# Patient Record
Sex: MALE | Race: WHITE | NOT HISPANIC OR LATINO | Employment: FULL TIME | URBAN - METROPOLITAN AREA
[De-identification: names, ages, dates, MRNs, and addresses within clinical notes are randomized per-mention and may not be internally consistent; named-entity substitution may affect disease eponyms.]

---

## 2023-03-20 DIAGNOSIS — E03.9 HYPOTHYROIDISM, UNSPECIFIED TYPE: Primary | ICD-10-CM

## 2023-03-20 RX ORDER — LEVOTHYROXINE SODIUM 112 UG/1
112 TABLET ORAL
Qty: 30 TABLET | Refills: 0 | Status: SHIPPED | OUTPATIENT
Start: 2023-03-20 | End: 2023-04-18 | Stop reason: SDUPTHER

## 2023-03-20 RX ORDER — LEVOTHYROXINE SODIUM 112 UG/1
112 TABLET ORAL
COMMUNITY
End: 2023-03-20 | Stop reason: SDUPTHER

## 2023-04-17 ENCOUNTER — OFFICE VISIT (OUTPATIENT)
Dept: PRIMARY CARE | Facility: CLINIC | Age: 53
End: 2023-04-17
Payer: MEDICAID

## 2023-04-17 VITALS
DIASTOLIC BLOOD PRESSURE: 77 MMHG | HEIGHT: 70 IN | WEIGHT: 221 LBS | BODY MASS INDEX: 31.64 KG/M2 | RESPIRATION RATE: 18 BRPM | OXYGEN SATURATION: 96 % | HEART RATE: 70 BPM | TEMPERATURE: 98 F | SYSTOLIC BLOOD PRESSURE: 118 MMHG

## 2023-04-17 DIAGNOSIS — L30.9 CHRONIC DERMATITIS: ICD-10-CM

## 2023-04-17 DIAGNOSIS — E78.2 HYPERLIPIDEMIA, MIXED: ICD-10-CM

## 2023-04-17 DIAGNOSIS — E03.9 HYPOTHYROIDISM, UNSPECIFIED TYPE: ICD-10-CM

## 2023-04-17 DIAGNOSIS — M54.50 CHRONIC MIDLINE LOW BACK PAIN WITHOUT SCIATICA: ICD-10-CM

## 2023-04-17 DIAGNOSIS — Z00.00 HEALTHCARE MAINTENANCE: Primary | ICD-10-CM

## 2023-04-17 DIAGNOSIS — G89.29 CHRONIC MIDLINE LOW BACK PAIN WITHOUT SCIATICA: ICD-10-CM

## 2023-04-17 PROBLEM — H93.13 OBJECTIVE TINNITUS OF BOTH EARS: Status: ACTIVE | Noted: 2023-04-17

## 2023-04-17 PROBLEM — M62.89 PELVIC FLOOR DYSFUNCTION: Status: ACTIVE | Noted: 2023-04-17

## 2023-04-17 PROBLEM — K64.9 HEMORRHOIDS: Status: ACTIVE | Noted: 2023-04-17

## 2023-04-17 PROBLEM — M81.0 OSTEOPOROSIS OF VERTEBRA: Status: ACTIVE | Noted: 2023-04-17

## 2023-04-17 PROBLEM — Z86.0100 PERSONAL HISTORY OF COLONIC POLYPS: Status: ACTIVE | Noted: 2023-04-17

## 2023-04-17 PROBLEM — K21.9 ACID REFLUX: Status: ACTIVE | Noted: 2023-04-17

## 2023-04-17 PROBLEM — R09.81 NASAL CONGESTION: Status: ACTIVE | Noted: 2023-04-17

## 2023-04-17 PROBLEM — H93.8X2 EAR FULLNESS, LEFT: Status: ACTIVE | Noted: 2023-04-17

## 2023-04-17 PROBLEM — H93.233 HYPERACUSIS OF BOTH EARS: Status: ACTIVE | Noted: 2023-04-17

## 2023-04-17 PROBLEM — K59.00 CONSTIPATION: Status: ACTIVE | Noted: 2023-04-17

## 2023-04-17 PROBLEM — H90.3 BILATERAL SENSORINEURAL HEARING LOSS: Status: ACTIVE | Noted: 2023-04-17

## 2023-04-17 PROBLEM — Z86.010 PERSONAL HISTORY OF COLONIC POLYPS: Status: ACTIVE | Noted: 2023-04-17

## 2023-04-17 PROBLEM — S22.080A COMPRESSION FRACTURE OF T11 VERTEBRA (MULTI): Status: ACTIVE | Noted: 2023-04-17

## 2023-04-17 PROBLEM — S30.0XXA CONTUSION OF COCCYX: Status: ACTIVE | Noted: 2023-04-17

## 2023-04-17 PROBLEM — M54.2 NECK PAIN: Status: ACTIVE | Noted: 2023-04-17

## 2023-04-17 PROBLEM — M54.6 PAIN IN THORACIC SPINE: Status: ACTIVE | Noted: 2023-04-17

## 2023-04-17 PROCEDURE — 82465 ASSAY BLD/SERUM CHOLESTEROL: CPT

## 2023-04-17 PROCEDURE — 84443 ASSAY THYROID STIM HORMONE: CPT

## 2023-04-17 PROCEDURE — 80053 COMPREHEN METABOLIC PANEL: CPT

## 2023-04-17 PROCEDURE — 36415 COLL VENOUS BLD VENIPUNCTURE: CPT | Performed by: INTERNAL MEDICINE

## 2023-04-17 PROCEDURE — 99396 PREV VISIT EST AGE 40-64: CPT | Performed by: INTERNAL MEDICINE

## 2023-04-17 PROCEDURE — 83718 ASSAY OF LIPOPROTEIN: CPT

## 2023-04-17 PROCEDURE — 1036F TOBACCO NON-USER: CPT | Performed by: INTERNAL MEDICINE

## 2023-04-17 RX ORDER — CLOTRIMAZOLE AND BETAMETHASONE DIPROPIONATE 10; .64 MG/G; MG/G
1 CREAM TOPICAL 2 TIMES DAILY
COMMUNITY
Start: 2021-03-22 | End: 2023-04-18 | Stop reason: SDUPTHER

## 2023-04-17 RX ORDER — KETOCONAZOLE 20 MG/G
CREAM TOPICAL 2 TIMES DAILY
COMMUNITY
Start: 2021-06-08

## 2023-04-17 RX ORDER — CLOTRIMAZOLE 1 G/ML
SOLUTION TOPICAL 2 TIMES DAILY
COMMUNITY
Start: 2023-01-09

## 2023-04-17 RX ORDER — HYDROCORTISONE 25 MG/G
CREAM TOPICAL 2 TIMES DAILY
COMMUNITY
Start: 2021-03-23 | End: 2023-09-15 | Stop reason: SDUPTHER

## 2023-04-17 SDOH — ECONOMIC STABILITY: TRANSPORTATION INSECURITY
IN THE PAST 12 MONTHS, HAS LACK OF TRANSPORTATION KEPT YOU FROM MEETINGS, WORK, OR FROM GETTING THINGS NEEDED FOR DAILY LIVING?: NO

## 2023-04-17 SDOH — ECONOMIC STABILITY: FOOD INSECURITY: WITHIN THE PAST 12 MONTHS, YOU WORRIED THAT YOUR FOOD WOULD RUN OUT BEFORE YOU GOT MONEY TO BUY MORE.: NEVER TRUE

## 2023-04-17 SDOH — HEALTH STABILITY: PHYSICAL HEALTH: ON AVERAGE, HOW MANY MINUTES DO YOU ENGAGE IN EXERCISE AT THIS LEVEL?: 40 MIN

## 2023-04-17 SDOH — ECONOMIC STABILITY: FOOD INSECURITY: WITHIN THE PAST 12 MONTHS, THE FOOD YOU BOUGHT JUST DIDN'T LAST AND YOU DIDN'T HAVE MONEY TO GET MORE.: NEVER TRUE

## 2023-04-17 SDOH — ECONOMIC STABILITY: INCOME INSECURITY: IN THE LAST 12 MONTHS, WAS THERE A TIME WHEN YOU WERE NOT ABLE TO PAY THE MORTGAGE OR RENT ON TIME?: NO

## 2023-04-17 SDOH — ECONOMIC STABILITY: HOUSING INSECURITY
IN THE LAST 12 MONTHS, WAS THERE A TIME WHEN YOU DID NOT HAVE A STEADY PLACE TO SLEEP OR SLEPT IN A SHELTER (INCLUDING NOW)?: NO

## 2023-04-17 SDOH — HEALTH STABILITY: PHYSICAL HEALTH: ON AVERAGE, HOW MANY DAYS PER WEEK DO YOU ENGAGE IN MODERATE TO STRENUOUS EXERCISE (LIKE A BRISK WALK)?: 3 DAYS

## 2023-04-17 SDOH — ECONOMIC STABILITY: TRANSPORTATION INSECURITY
IN THE PAST 12 MONTHS, HAS THE LACK OF TRANSPORTATION KEPT YOU FROM MEDICAL APPOINTMENTS OR FROM GETTING MEDICATIONS?: NO

## 2023-04-17 ASSESSMENT — SOCIAL DETERMINANTS OF HEALTH (SDOH)
WITHIN THE LAST YEAR, HAVE YOU BEEN KICKED, HIT, SLAPPED, OR OTHERWISE PHYSICALLY HURT BY YOUR PARTNER OR EX-PARTNER?: NO
HOW OFTEN DO YOU ATTEND CHURCH OR RELIGIOUS SERVICES?: 1 TO 4 TIMES PER YEAR
ARE YOU MARRIED, WIDOWED, DIVORCED, SEPARATED, NEVER MARRIED, OR LIVING WITH A PARTNER?: PATIENT DECLINED
WITHIN THE LAST YEAR, HAVE TO BEEN RAPED OR FORCED TO HAVE ANY KIND OF SEXUAL ACTIVITY BY YOUR PARTNER OR EX-PARTNER?: NO
HOW OFTEN DO YOU GET TOGETHER WITH FRIENDS OR RELATIVES?: TWICE A WEEK
IN A TYPICAL WEEK, HOW MANY TIMES DO YOU TALK ON THE PHONE WITH FAMILY, FRIENDS, OR NEIGHBORS?: TWICE A WEEK
HOW HARD IS IT FOR YOU TO PAY FOR THE VERY BASICS LIKE FOOD, HOUSING, MEDICAL CARE, AND HEATING?: NOT VERY HARD
WITHIN THE LAST YEAR, HAVE YOU BEEN HUMILIATED OR EMOTIONALLY ABUSED IN OTHER WAYS BY YOUR PARTNER OR EX-PARTNER?: NO
WITHIN THE LAST YEAR, HAVE YOU BEEN AFRAID OF YOUR PARTNER OR EX-PARTNER?: NO
HOW OFTEN DO YOU ATTENT MEETINGS OF THE CLUB OR ORGANIZATION YOU BELONG TO?: PATIENT DECLINED
DO YOU BELONG TO ANY CLUBS OR ORGANIZATIONS SUCH AS CHURCH GROUPS UNIONS, FRATERNAL OR ATHLETIC GROUPS, OR SCHOOL GROUPS?: PATIENT DECLINED

## 2023-04-17 ASSESSMENT — ANXIETY QUESTIONNAIRES
3. WORRYING TOO MUCH ABOUT DIFFERENT THINGS: NOT AT ALL
IF YOU CHECKED OFF ANY PROBLEMS ON THIS QUESTIONNAIRE, HOW DIFFICULT HAVE THESE PROBLEMS MADE IT FOR YOU TO DO YOUR WORK, TAKE CARE OF THINGS AT HOME, OR GET ALONG WITH OTHER PEOPLE: NOT DIFFICULT AT ALL
7. FEELING AFRAID AS IF SOMETHING AWFUL MIGHT HAPPEN: NOT AT ALL
2. NOT BEING ABLE TO STOP OR CONTROL WORRYING: NOT AT ALL
5. BEING SO RESTLESS THAT IT IS HARD TO SIT STILL: NOT AT ALL
6. BECOMING EASILY ANNOYED OR IRRITABLE: NOT AT ALL
1. FEELING NERVOUS, ANXIOUS, OR ON EDGE: NOT AT ALL
4. TROUBLE RELAXING: NOT AT ALL
GAD7 TOTAL SCORE: 0

## 2023-04-17 ASSESSMENT — LIFESTYLE VARIABLES
HOW OFTEN DO YOU HAVE SIX OR MORE DRINKS ON ONE OCCASION: NEVER
HOW OFTEN DO YOU HAVE A DRINK CONTAINING ALCOHOL: 2-4 TIMES A MONTH
HOW MANY STANDARD DRINKS CONTAINING ALCOHOL DO YOU HAVE ON A TYPICAL DAY: 1 OR 2
AUDIT-C TOTAL SCORE: 2
SKIP TO QUESTIONS 9-10: 1

## 2023-04-17 ASSESSMENT — PATIENT HEALTH QUESTIONNAIRE - PHQ9
2. FEELING DOWN, DEPRESSED OR HOPELESS: NOT AT ALL
SUM OF ALL RESPONSES TO PHQ9 QUESTIONS 1 & 2: 0
1. LITTLE INTEREST OR PLEASURE IN DOING THINGS: NOT AT ALL

## 2023-04-17 ASSESSMENT — PAIN SCALES - GENERAL: PAINLEVEL: 4

## 2023-04-17 ASSESSMENT — ENCOUNTER SYMPTOMS
ACTIVITY CHANGE: 0
LOSS OF SENSATION IN FEET: 0
CHEST TIGHTNESS: 0
OCCASIONAL FEELINGS OF UNSTEADINESS: 0
SINUS PRESSURE: 0
SORE THROAT: 0
MYALGIAS: 0
DEPRESSION: 0
CHILLS: 0
WEAKNESS: 0
AGITATION: 0
SHORTNESS OF BREATH: 0

## 2023-04-17 ASSESSMENT — COLUMBIA-SUICIDE SEVERITY RATING SCALE - C-SSRS
2. HAVE YOU ACTUALLY HAD ANY THOUGHTS OF KILLING YOURSELF?: NO
1. IN THE PAST MONTH, HAVE YOU WISHED YOU WERE DEAD OR WISHED YOU COULD GO TO SLEEP AND NOT WAKE UP?: NO
6. HAVE YOU EVER DONE ANYTHING, STARTED TO DO ANYTHING, OR PREPARED TO DO ANYTHING TO END YOUR LIFE?: NO

## 2023-04-17 NOTE — PROGRESS NOTES
"Subjective   Patient ID: Glenn Kerns is a 53 y.o. male who presents for Annual Exam. Anrdews still has back pain from his fall down the stairs.  He is still needing therapy for his back.  He is seeing acupuncture for this as well but he has never fully recovered.  Also with recurrent dermatitis helped with a steroid cream.      HPI     Review of Systems   Constitutional:  Negative for activity change and chills.   HENT:  Negative for congestion, sinus pressure and sore throat.    Respiratory:  Negative for chest tightness and shortness of breath.    Cardiovascular:  Negative for chest pain.   Musculoskeletal:  Positive for back pain. Negative for gait problem, joint swelling and myalgias.   Neurological:  Negative for weakness.   Psychiatric/Behavioral:  Negative for agitation and behavioral problems.        Objective   /77 (BP Location: Left arm, Patient Position: Sitting, BP Cuff Size: Large adult)   Pulse 70   Temp 36.7 °C (98 °F) (Temporal)   Resp 18   Ht 1.778 m (5' 10\")   Wt 100 kg (221 lb)   SpO2 96%   BMI 31.71 kg/m²     Physical Exam  Constitutional:       Appearance: Normal appearance.   HENT:      Head: Normocephalic and atraumatic.   Eyes:      Extraocular Movements: Extraocular movements intact.   Pulmonary:      Effort: No respiratory distress.      Breath sounds: No wheezing.   Neurological:      General: No focal deficit present.         Assessment/Plan   Problem List Items Addressed This Visit    None  Visit Diagnoses       Healthcare maintenance    -  Primary    Relevant Orders    Thyroid Stimulating Hormone (Completed)    Comprehensive Metabolic Panel (Completed)    Lipid Panel Non-Fasting (Completed)    Follow Up In Primary Care    Chronic dermatitis        Relevant Medications    clotrimazole-betamethasone (Lotrisone) cream    Chronic midline low back pain without sciatica        Relevant Orders    Referral to Physical Therapy    Hypothyroidism, unspecified type        Relevant " Medications    levothyroxine (Synthroid, Levoxyl) 112 mcg tablet    Hyperlipidemia, mixed        Relevant Orders    Follow Up In Primary Care

## 2023-04-18 LAB
ALANINE AMINOTRANSFERASE (SGPT) (U/L) IN SER/PLAS: 34 U/L (ref 10–52)
ALBUMIN (G/DL) IN SER/PLAS: 4.8 G/DL (ref 3.4–5)
ALKALINE PHOSPHATASE (U/L) IN SER/PLAS: 55 U/L (ref 33–120)
ANION GAP IN SER/PLAS: 15 MMOL/L (ref 10–20)
ASPARTATE AMINOTRANSFERASE (SGOT) (U/L) IN SER/PLAS: 24 U/L (ref 9–39)
BILIRUBIN TOTAL (MG/DL) IN SER/PLAS: 1 MG/DL (ref 0–1.2)
CALCIUM (MG/DL) IN SER/PLAS: 9.7 MG/DL (ref 8.6–10.6)
CARBON DIOXIDE, TOTAL (MMOL/L) IN SER/PLAS: 26 MMOL/L (ref 21–32)
CHLORIDE (MMOL/L) IN SER/PLAS: 103 MMOL/L (ref 98–107)
CHOLESTEROL (MG/DL) IN SER/PLAS: 219 MG/DL (ref 0–199)
CHOLESTEROL IN HDL (MG/DL) IN SER/PLAS: 45.9 MG/DL
CHOLESTEROL/HDL RATIO: 4.8
CREATININE (MG/DL) IN SER/PLAS: 1.02 MG/DL (ref 0.5–1.3)
GFR MALE: 88 ML/MIN/1.73M2
GLUCOSE (MG/DL) IN SER/PLAS: 85 MG/DL (ref 74–99)
NON-HDL CHOLESTEROL: 173 MG/DL
POTASSIUM (MMOL/L) IN SER/PLAS: 3.8 MMOL/L (ref 3.5–5.3)
PROTEIN TOTAL: 7.4 G/DL (ref 6.4–8.2)
SODIUM (MMOL/L) IN SER/PLAS: 140 MMOL/L (ref 136–145)
THYROTROPIN (MIU/L) IN SER/PLAS BY DETECTION LIMIT <= 0.05 MIU/L: 1.63 MIU/L (ref 0.44–3.98)
UREA NITROGEN (MG/DL) IN SER/PLAS: 15 MG/DL (ref 6–23)

## 2023-04-18 RX ORDER — LEVOTHYROXINE SODIUM 112 UG/1
112 TABLET ORAL
Qty: 90 TABLET | Refills: 3 | Status: SHIPPED | OUTPATIENT
Start: 2023-04-18 | End: 2024-04-25 | Stop reason: SDUPTHER

## 2023-04-18 RX ORDER — CLOTRIMAZOLE AND BETAMETHASONE DIPROPIONATE 10; .64 MG/G; MG/G
1 CREAM TOPICAL 2 TIMES DAILY
Qty: 45 G | Refills: 1 | Status: SHIPPED | OUTPATIENT
Start: 2023-04-18 | End: 2023-05-18

## 2023-04-18 ASSESSMENT — ENCOUNTER SYMPTOMS
JOINT SWELLING: 0
BACK PAIN: 1

## 2023-09-11 ENCOUNTER — OFFICE VISIT (OUTPATIENT)
Dept: PRIMARY CARE | Facility: CLINIC | Age: 53
End: 2023-09-11
Payer: MEDICAID

## 2023-09-11 VITALS
RESPIRATION RATE: 18 BRPM | OXYGEN SATURATION: 98 % | HEIGHT: 70 IN | HEART RATE: 81 BPM | TEMPERATURE: 97.6 F | DIASTOLIC BLOOD PRESSURE: 83 MMHG | WEIGHT: 224 LBS | SYSTOLIC BLOOD PRESSURE: 145 MMHG | BODY MASS INDEX: 32.07 KG/M2

## 2023-09-11 DIAGNOSIS — Z12.11 COLON CANCER SCREENING: ICD-10-CM

## 2023-09-11 DIAGNOSIS — B35.6 TINEA CRURIS: ICD-10-CM

## 2023-09-11 DIAGNOSIS — K64.2 GRADE III HEMORRHOIDS: Primary | ICD-10-CM

## 2023-09-11 PROCEDURE — 99214 OFFICE O/P EST MOD 30 MIN: CPT | Performed by: INTERNAL MEDICINE

## 2023-09-11 PROCEDURE — 1036F TOBACCO NON-USER: CPT | Performed by: INTERNAL MEDICINE

## 2023-09-11 RX ORDER — CLOTRIMAZOLE AND BETAMETHASONE DIPROPIONATE 10; .64 MG/G; MG/G
1 CREAM TOPICAL 2 TIMES DAILY
Qty: 45 G | Refills: 1 | Status: SHIPPED | OUTPATIENT
Start: 2023-09-11 | End: 2023-10-10 | Stop reason: SDUPTHER

## 2023-09-11 RX ORDER — HYDROCORTISONE ACETATE 25 MG/1
25 SUPPOSITORY RECTAL 2 TIMES DAILY PRN
Qty: 30 SUPPOSITORY | Refills: 5 | Status: SHIPPED | OUTPATIENT
Start: 2023-09-11 | End: 2023-09-14 | Stop reason: SDUPTHER

## 2023-09-11 ASSESSMENT — ENCOUNTER SYMPTOMS
CHILLS: 0
SINUS PRESSURE: 0
ACTIVITY CHANGE: 0
AGITATION: 0
WEAKNESS: 0
MYALGIAS: 0
SORE THROAT: 0
PALPITATIONS: 0
SHORTNESS OF BREATH: 0
NAUSEA: 0
DIARRHEA: 0
CHEST TIGHTNESS: 0

## 2023-09-11 ASSESSMENT — PAIN SCALES - GENERAL: PAINLEVEL: 0-NO PAIN

## 2023-09-11 NOTE — PROGRESS NOTES
"Subjective   Patient ID: Glenn Kerns is a 53 y.o. male who presents for Hemorrhoids.  Fiber seemed to cause constipation.  He senses hemorrhoids that are more prominent.  He feels they are coming down.  He tries to manage it but its still a problem.  He noted some blood a week ago. Yesterday he sensed a fluttering in his rectum that is recurrent.  He tried ice pack and epsom salts.      HPI     Review of Systems   Constitutional:  Negative for activity change and chills.   HENT:  Negative for congestion, sinus pressure and sore throat.    Respiratory:  Negative for chest tightness and shortness of breath.    Cardiovascular:  Negative for chest pain and palpitations.   Gastrointestinal:  Negative for diarrhea and nausea.   Musculoskeletal:  Negative for myalgias.   Neurological:  Negative for weakness.   Psychiatric/Behavioral:  Negative for agitation and behavioral problems.        Objective   /83 (BP Location: Left arm, Patient Position: Sitting, BP Cuff Size: Large adult)   Pulse 81   Temp 36.4 °C (97.6 °F) (Temporal)   Resp 18   Ht 1.778 m (5' 10\")   Wt 102 kg (224 lb)   SpO2 98%   BMI 32.14 kg/m²     Physical Exam  Constitutional:       Appearance: Normal appearance.   HENT:      Head: Normocephalic and atraumatic.      Nose: Nose normal.      Mouth/Throat:      Mouth: Mucous membranes are moist.   Eyes:      Extraocular Movements: Extraocular movements intact.      Pupils: Pupils are equal, round, and reactive to light.   Cardiovascular:      Rate and Rhythm: Normal rate and regular rhythm.   Pulmonary:      Effort: No respiratory distress.      Breath sounds: No wheezing.   Abdominal:      General: Bowel sounds are normal.      Palpations: Abdomen is soft.   Neurological:      General: No focal deficit present.         Assessment/Plan   Problem List Items Addressed This Visit       Hemorrhoids - Primary    Relevant Medications    hydrocortisone (Anusol-HC) 25 mg suppository     Other Visit " Diagnoses       Tinea cruris        Relevant Medications    clotrimazole-betamethasone (Lotrisone) cream    Colon cancer screening        Relevant Orders    Referral to Gastroenterology        He wants to see GI for more advice.

## 2023-09-14 DIAGNOSIS — K64.2 GRADE III HEMORRHOIDS: ICD-10-CM

## 2023-09-14 RX ORDER — HYDROCORTISONE ACETATE 25 MG/1
25 SUPPOSITORY RECTAL DAILY
Qty: 24 SUPPOSITORY | Refills: 1 | Status: SHIPPED | OUTPATIENT
Start: 2023-09-14 | End: 2024-09-13

## 2023-09-15 DIAGNOSIS — K64.2 GRADE III HEMORRHOIDS: Primary | ICD-10-CM

## 2023-09-15 NOTE — TELEPHONE ENCOUNTER
Patient request to have a refill on the rectal cream for relief on outer area of rectum.  FYI the patient did get the suppository from pharmacy.

## 2023-09-17 RX ORDER — HYDROCORTISONE 25 MG/G
CREAM TOPICAL 2 TIMES DAILY
Qty: 30 G | Refills: 3 | Status: SHIPPED | OUTPATIENT
Start: 2023-09-17 | End: 2024-03-26 | Stop reason: SDUPTHER

## 2023-10-10 ENCOUNTER — OFFICE VISIT (OUTPATIENT)
Dept: PRIMARY CARE | Facility: CLINIC | Age: 53
End: 2023-10-10
Payer: MEDICAID

## 2023-10-10 VITALS
TEMPERATURE: 97.7 F | OXYGEN SATURATION: 96 % | DIASTOLIC BLOOD PRESSURE: 86 MMHG | RESPIRATION RATE: 18 BRPM | BODY MASS INDEX: 31.21 KG/M2 | WEIGHT: 218 LBS | SYSTOLIC BLOOD PRESSURE: 132 MMHG | HEIGHT: 70 IN | HEART RATE: 81 BPM

## 2023-10-10 DIAGNOSIS — R35.89 POLYURIA: Primary | ICD-10-CM

## 2023-10-10 DIAGNOSIS — R63.1 POLYDIPSIA: ICD-10-CM

## 2023-10-10 DIAGNOSIS — J40 BRONCHITIS: ICD-10-CM

## 2023-10-10 DIAGNOSIS — B35.6 TINEA CRURIS: ICD-10-CM

## 2023-10-10 PROCEDURE — 99213 OFFICE O/P EST LOW 20 MIN: CPT | Performed by: INTERNAL MEDICINE

## 2023-10-10 PROCEDURE — 83036 HEMOGLOBIN GLYCOSYLATED A1C: CPT

## 2023-10-10 PROCEDURE — 82043 UR ALBUMIN QUANTITATIVE: CPT

## 2023-10-10 PROCEDURE — 36415 COLL VENOUS BLD VENIPUNCTURE: CPT

## 2023-10-10 PROCEDURE — 82570 ASSAY OF URINE CREATININE: CPT

## 2023-10-10 PROCEDURE — 1036F TOBACCO NON-USER: CPT | Performed by: INTERNAL MEDICINE

## 2023-10-10 RX ORDER — CLOTRIMAZOLE AND BETAMETHASONE DIPROPIONATE 10; .64 MG/G; MG/G
1 CREAM TOPICAL 2 TIMES DAILY
Qty: 6 G | Refills: 0 | Status: SHIPPED | OUTPATIENT
Start: 2023-10-10 | End: 2023-11-09

## 2023-10-10 RX ORDER — AZITHROMYCIN 250 MG/1
TABLET, FILM COATED ORAL
Qty: 6 TABLET | Refills: 0 | Status: SHIPPED | OUTPATIENT
Start: 2023-10-10 | End: 2023-10-14

## 2023-10-10 RX ORDER — BENZONATATE 200 MG/1
200 CAPSULE ORAL 3 TIMES DAILY PRN
COMMUNITY
Start: 2023-10-02 | End: 2024-04-25 | Stop reason: WASHOUT

## 2023-10-10 RX ORDER — DOXYCYCLINE 100 MG/1
100 CAPSULE ORAL 2 TIMES DAILY
COMMUNITY
Start: 2023-10-02 | End: 2024-04-25 | Stop reason: WASHOUT

## 2023-10-10 ASSESSMENT — ENCOUNTER SYMPTOMS
COUGH: 1
HOARSE VOICE: 1

## 2023-10-10 NOTE — PROGRESS NOTES
"Subjective   Patient ID: Glenn Kerns is a 53 y.o. male who presents for Sinusitis. Had 7 days of doxycycline.  Mucous was brown and green.  Now its mostly white now.  Three days ago he stood in the shower and felt light-headed.  He went to [place].  Also reports a return of his inguinal rash (jock itch).    Sinusitis  The current episode started 1 to 4 weeks ago. The problem has been gradually worsening since onset. Associated symptoms include congestion, coughing and a hoarse voice. Past treatments include antibiotics.        Review of Systems   HENT:  Positive for congestion and hoarse voice.    Respiratory:  Positive for cough.        Objective   /86 (BP Location: Left arm, Patient Position: Sitting, BP Cuff Size: Large adult)   Pulse 81   Temp 36.5 °C (97.7 °F)   Resp 18   Ht 1.778 m (5' 10\")   Wt 98.9 kg (218 lb)   SpO2 96%   BMI 31.28 kg/m²     Physical Exam  Constitutional:       Appearance: Normal appearance.   HENT:      Head: Normocephalic and atraumatic.      Nose: Nose normal.      Mouth/Throat:      Mouth: Mucous membranes are moist.   Eyes:      Extraocular Movements: Extraocular movements intact.      Pupils: Pupils are equal, round, and reactive to light.   Cardiovascular:      Rate and Rhythm: Normal rate and regular rhythm.   Pulmonary:      Effort: No respiratory distress.      Breath sounds: No wheezing.      Comments: Increased breath sounds on the right base  Abdominal:      General: Bowel sounds are normal.      Palpations: Abdomen is soft.   Neurological:      General: No focal deficit present.         Assessment/Plan   Problem List Items Addressed This Visit             ICD-10-CM    Bronchitis J40    Relevant Medications    azithromycin (Zithromax) 250 mg tablet    Polydipsia R63.1    Relevant Orders    Hemoglobin A1C    Albumin , Urine Random    Polyuria - Primary R35.89    Relevant Orders    Hemoglobin A1C    Albumin , Urine Random     Other Visit Diagnoses         Codes    " Tinea cruris     B35.6    Relevant Medications    clotrimazole-betamethasone (Lotrisone) cream        We will rule out diabetes and treat a little longer for prolonged bronchitis.

## 2023-10-11 ENCOUNTER — TELEPHONE (OUTPATIENT)
Dept: PRIMARY CARE | Facility: CLINIC | Age: 53
End: 2023-10-11
Payer: MEDICAID

## 2023-10-11 LAB
CREAT UR-MCNC: 159.4 MG/DL (ref 20–370)
EST. AVERAGE GLUCOSE BLD GHB EST-MCNC: 105 MG/DL
HBA1C MFR BLD: 5.3 %
MICROALBUMIN UR-MCNC: <7 MG/L
MICROALBUMIN/CREAT UR: NORMAL MG/G{CREAT}

## 2023-10-11 NOTE — TELEPHONE ENCOUNTER
Result Communication    Resulted Orders   Hemoglobin A1C   Result Value Ref Range    Hemoglobin A1C 5.3 see below %    Estimated Average Glucose 105 Not Established mg/dL    Narrative    Diagnosis of Diabetes-Adults  Non-Diabetic: < or = 5.6%  Increased risk for developing diabetes: 5.7-6.4%  Diagnostic of diabetes: > or = 6.5%    Monitoring of Diabetes  Age (y)....................... Therapeutic Goal (%)  Adults: >18.........................<7.0  Pediatrics: 13-18...................<7.5  Pediatrics: 7-12....................<8.0  Pediatrics: 0-6..................... 7.5-8.5    American Diabetes Association. Diabetes Care 33(S1), Jan 2010       Albumin , Urine Random   Result Value Ref Range    Albumin, Urine Random <7.0 Not established mg/L    Creatinine, Urine Random 159.4 20.0 - 370.0 mg/dL    Albumin/Creatine Ratio        Comment:      One or more analytes used in this calculation is outside of the analytical measurement range. Calculation cannot be performed.       9:28 AM      Results were successfully communicated with the patient and they acknowledged their understanding.

## 2023-10-19 ENCOUNTER — APPOINTMENT (OUTPATIENT)
Dept: GASTROENTEROLOGY | Facility: CLINIC | Age: 53
End: 2023-10-19
Payer: MEDICAID

## 2023-10-24 ENCOUNTER — OFFICE VISIT (OUTPATIENT)
Dept: PRIMARY CARE | Facility: CLINIC | Age: 53
End: 2023-10-24
Payer: MEDICAID

## 2023-10-24 VITALS
TEMPERATURE: 98.1 F | BODY MASS INDEX: 31.73 KG/M2 | OXYGEN SATURATION: 96 % | HEART RATE: 68 BPM | WEIGHT: 221.6 LBS | SYSTOLIC BLOOD PRESSURE: 125 MMHG | RESPIRATION RATE: 18 BRPM | HEIGHT: 70 IN | DIASTOLIC BLOOD PRESSURE: 83 MMHG

## 2023-10-24 DIAGNOSIS — R09.81 NASAL CONGESTION: Primary | ICD-10-CM

## 2023-10-24 PROCEDURE — 99213 OFFICE O/P EST LOW 20 MIN: CPT | Performed by: INTERNAL MEDICINE

## 2023-10-24 PROCEDURE — 1036F TOBACCO NON-USER: CPT | Performed by: INTERNAL MEDICINE

## 2023-10-24 ASSESSMENT — PAIN SCALES - GENERAL: PAINLEVEL: 0-NO PAIN

## 2023-10-24 ASSESSMENT — ENCOUNTER SYMPTOMS
SINUS PAIN: 0
FATIGUE: 0
SORE THROAT: 0
SINUS PRESSURE: 0

## 2023-10-24 NOTE — PROGRESS NOTES
"Subjective   Patient ID: Glenn Kerns is a 53 y.o. male who presents for Medication Problem.  He is here with continued sputum that is off color.  Saw a chat doctor online.  Put on doxycyline and had a reaction in the shower.  Came to see me and I gave z-can and he took 4 pills and he had to stop due to vertigo symptoms and diarrhea.  He stopped the antibiotic and took gustavo and sinus dripping.  Still with sinus dripping.  He has a appointment with someone related to his hemorrhoids.      HPI     Review of Systems   Constitutional:  Negative for fatigue.   HENT:  Negative for sinus pressure, sinus pain and sore throat.        Objective   /83 (BP Location: Left arm, Patient Position: Sitting, BP Cuff Size: Large adult)   Pulse 68   Temp 36.7 °C (98.1 °F) (Temporal)   Resp 18   Ht 1.778 m (5' 10\")   Wt 101 kg (221 lb 9.6 oz)   SpO2 96%   BMI 31.80 kg/m²     Physical Exam  Constitutional:       Appearance: Normal appearance.   HENT:      Head: Normocephalic and atraumatic.      Nose: Nose normal.      Mouth/Throat:      Mouth: Mucous membranes are moist.   Cardiovascular:      Rate and Rhythm: Normal rate and regular rhythm.   Pulmonary:      Effort: No respiratory distress.      Breath sounds: No wheezing.   Abdominal:      General: Bowel sounds are normal.      Palpations: Abdomen is soft.         Assessment/Plan   Problem List Items Addressed This Visit             ICD-10-CM    Nasal congestion - Primary R09.81   Patient asked to try oil of oregano since he had trouble with antibiotics.  I reassured him that was going on is not a serious problem and that he should embrace healthy habits and diet.       "

## 2023-11-09 ENCOUNTER — APPOINTMENT (OUTPATIENT)
Dept: GASTROENTEROLOGY | Facility: CLINIC | Age: 53
End: 2023-11-09
Payer: MEDICAID

## 2023-12-06 ENCOUNTER — APPOINTMENT (OUTPATIENT)
Dept: GASTROENTEROLOGY | Facility: CLINIC | Age: 53
End: 2023-12-06
Payer: MEDICAID

## 2024-01-29 ENCOUNTER — DOCUMENTATION (OUTPATIENT)
Dept: PHYSICAL THERAPY | Facility: CLINIC | Age: 54
End: 2024-01-29
Payer: MEDICAID

## 2024-01-29 NOTE — PROGRESS NOTES
Physical Therapy    Discharge Summary    Name: Glenn Kerns  MRN: [id number]  : 1970  Date: 24    Discharge Summary: PT    Discharge Information: Date of discharge 24    Therapy Summary: progress plateaued     Discharge Status: discharge to [facility]     Rehab Discharge Reason: Failed to schedule and/or keep follow-up appointment(s)

## 2024-02-01 PROBLEM — M54.16 LUMBAR RADICULOPATHY: Status: ACTIVE | Noted: 2024-02-01

## 2024-02-01 NOTE — PROGRESS NOTES
Physical Therapy    Physical Therapy Evaluation and Treatment      Patient Name: Glenn Kerns  MRN: [id number]  Today's Date: 2/2/2024  Visit1  Time Calculation  Start Time: 1055  Stop Time: 1200  Time Calculation (min): 65 min    Assessment:    Patient presents with clinical signs and symptoms consistent with lumbar disc derangement with R L5 and L S1 LE radicular symptoms.   These impairments affect ADLs, work, recreational, exercise, transfer, ambulation, lift/carry, and sleep function that requires skilled PT intervention to resolve and enable patient to return to previous level of function. Factors that may affect progress in PT are chronic pain, obesity, medical co-morbidities,  work task strain,  depression, and patient compliance. Patient is a   extension/stabilization,  program candidate.  Initial treatment of extensive education regarding Veronica extension approach and prone positioning with cold to anesthatize the lumbar region soft tissues  Plan:  OP PT Plan  Treatment/Interventions: Cryotherapy, Education/ Instruction, Electrical stimulation, Hot pack, Manual therapy, Neuromuscular re-education, Self care/ home management, Taping techniques, Therapeutic activities, Therapeutic exercises  PT Plan: Skilled PT  PT Frequency: 2 times per week  Duration: 12  Onset Date: 11/15/23  Certification Period Start Date: 02/02/24  Certification Period End Date: 05/02/24  Rehab Potential: Good  Plan of Care Agreement: Patient    Current Problem:   1. Lumbar radiculopathy  Follow Up In Physical Therapy    Follow Up In Physical Therapy          Subjective    Onset 11/15/23 lifting box. Now has LBP and R big toe numbness and L S1 distribution symptom distal LE. H/o T11 Fx in 2021 and was treated here. 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 Worse any prolonged position sometimes better reyna pose position. Glenn Kerns goes to acupuncture 1x/wk   Scheduled for EMG next week.  Pain:   6/10   Home  Living:   Lives with elderly parent 2 story home  Prior Level of Function:  Glenn Kerns had LBP but not radicular LE symptom  Prior Function Per Pt/Caregiver Report  Hand Dominance: Right    Objective   Cognition:   A+O x3  Observation:    Hip joint clearance: PROM  Clear  Single leg stance:  Eyes open  R  sec   L  secNT    Lumbar AROM in standing:    Flex  <50 % P fingers to  , Extension   90 %  ,   Sidebending  R  90%  L  50% P L  ,  BB with rotation      Myotomal Strength:  L3   R 5/5   L 5/5,  L4  R  5/5  L  5/5, L5 R  5/5  L 5 /5,  S1   R  5/5  L 5 /5    Sensation: Numbness R gr toe  L5 dermatomal distribution      Accessory joint mobility: PA glide hypomobile   all lumbar with  Pain    Lumbopelvic mobility: Side glide  R   L   restricted   cleared    Palpation: tenderness to S1 to L1 spinous process,  iliac crest  PSIS R  L   and  lateral sacral border  R    L    all posterior soft tissues hypersensitive    Gait: WNL  Special Tests:   , R SLR 45 deg, L SLR + at 45 deg distal in S1  Outcome Measures:  Oswestry: 48%    Treatments:  There ex: Delma extension approach prone positioning, extensive discussion about regressing other exercises that he had been doing and focus on prone position and minimizing lumbar flexion strain to minimize lumbar disc derangement    EDUCATION:   extensive education regarding mechanism of injury, relevant functional anatomy, treatment program rational, self management, HEP, and POC   focusing on Delma extension approach starting with prone position and avoidance of lumbar flexion. Glenn Kerns has a good lumbar roll to minimize lumbar flexion in sitting    Goals:  1. Decrease pain LBP  to 0-3/10 severity level   2. Centralize bilat LE radicular symptom  3. Increase lumbar FWD bending to fingers to midshin and return to upright neutral pain free  4. Reduce lumbar soft tissue hypersensitivity by 50%  5. Improve oswestry score by 10%points

## 2024-02-02 ENCOUNTER — EVALUATION (OUTPATIENT)
Dept: PHYSICAL THERAPY | Facility: CLINIC | Age: 54
End: 2024-02-02
Payer: MEDICAID

## 2024-02-02 DIAGNOSIS — M54.16 LUMBAR RADICULOPATHY: Primary | ICD-10-CM

## 2024-02-02 PROCEDURE — 97535 SELF CARE MNGMENT TRAINING: CPT | Mod: GP | Performed by: PHYSICAL THERAPIST

## 2024-02-02 PROCEDURE — 97162 PT EVAL MOD COMPLEX 30 MIN: CPT | Mod: GP | Performed by: PHYSICAL THERAPIST

## 2024-02-02 PROCEDURE — 97110 THERAPEUTIC EXERCISES: CPT | Mod: GP | Performed by: PHYSICAL THERAPIST

## 2024-02-02 ASSESSMENT — ENCOUNTER SYMPTOMS
DEPRESSION: 0
OCCASIONAL FEELINGS OF UNSTEADINESS: 0
LOSS OF SENSATION IN FEET: 1

## 2024-02-09 NOTE — PROGRESS NOTES
Physical Therapy    Physical Therapy Evaluation and Treatment      Patient Name: Glenn Kerns  MRN: [id number]  Today's Date: 2/12/24  Visit 2  Time Calculation  Start Time: 0900  Stop Time: 0945  Time Calculation (min): 45 min    Assessment:  Glenn Kerns is responsive to extension positioning approach to centralize bilat LE radicular symptom . He progressed to  to 2 pillow prone extension and  hard walk    Plan:   Continue Extension POC progress to posterior chain and core activation.    Current Problem:   1. Lumbar radiculopathy  Follow Up In Physical Therapy          Subjective    Glenn Kerns had an EMG last week and it was difficult. He initially had increased LBP and fluctuating  bilat leg symptom but is better today   Pain:   6/10        Objective     Observation:    Glenn Kerns entered clinic with lumbar cinch up support on but  too loose    Treatments:  Modality:  HP to lumbar region in prone 10 min  There ex:   Delma extension approach prone positioning, flat to 2 pillow static extension  Hard walk psot reduction     EDUCATION:  2 pillow static prone progression     Goals:  1. Decrease pain LBP  to 0-3/10 severity level   2. Centralize bilat LE radicular symptom  3. Increase lumbar FWD bending to fingers to midshin and return to upright neutral pain free  4. Reduce lumbar soft tissue hypersensitivity by 50%  5. Improve oswestry score by 10%points

## 2024-02-12 ENCOUNTER — TREATMENT (OUTPATIENT)
Dept: PHYSICAL THERAPY | Facility: CLINIC | Age: 54
End: 2024-02-12
Payer: MEDICAID

## 2024-02-12 DIAGNOSIS — M54.16 LUMBAR RADICULOPATHY: Primary | ICD-10-CM

## 2024-02-12 PROCEDURE — 97110 THERAPEUTIC EXERCISES: CPT | Mod: GP | Performed by: PHYSICAL THERAPIST

## 2024-02-12 PROCEDURE — 97010 HOT OR COLD PACKS THERAPY: CPT | Mod: GP | Performed by: PHYSICAL THERAPIST

## 2024-02-14 NOTE — PROGRESS NOTES
Physical Therapy    Physical Therapy  and Treatment      Patient Name: Glenn Kerns  MRN: [id number]  Today's Date: 2/15/24  Visit 3  Time Calculation  Start Time: 1345  Stop Time: 1430  Time Calculation (min): 45 min    Assessment:  Glenn Kerns has a lateral component to his L LE radicular symptom and is reducible. His L LE sx is centralized and he has slight L hip pain 2/10   Plan:   Continue Extension POC progress to posterior chain and core activation.    Current Problem:   1. Lumbar radiculopathy  Follow Up In Physical Therapy          Subjective    Glenn Kerns having a bad day today. He had acupuncture 2 days ago and has been worse since. He has more severe L heel pain . The back brace tightened up help   Pain:   6-7/10        Objective   Observation:      Antalgic L  LE painful at L heel strike  Treatments:  Modality:  HP to lumbar region in prone 10 min  There ex: 25min  Delma extension approach prone positioning, flat to 2 pillow static extension  Standing R to L side glide to centralize L LE symptom  Prone press up 3x10 Including L to R side glide component  Recovery walk post reduction   Standing green tband row  both arms and alternate arms 2 x 15  Manual therapy: 15 min  Prone press up 3x10 Including L to R side glide component  EDUCATION:  2 pillow static prone progression     Goals:  1. Decrease pain LBP  to 0-3/10 severity level   2. Centralize bilat LE radicular symptom  3. Increase lumbar FWD bending to fingers to midshin and return to upright neutral pain free  4. Reduce lumbar soft tissue hypersensitivity by 50%  5. Improve oswestry score by 10%points

## 2024-02-15 ENCOUNTER — TREATMENT (OUTPATIENT)
Dept: PHYSICAL THERAPY | Facility: CLINIC | Age: 54
End: 2024-02-15
Payer: MEDICAID

## 2024-02-15 DIAGNOSIS — M54.16 LUMBAR RADICULOPATHY: Primary | ICD-10-CM

## 2024-02-15 PROCEDURE — 97140 MANUAL THERAPY 1/> REGIONS: CPT | Mod: GP | Performed by: PHYSICAL THERAPIST

## 2024-02-15 PROCEDURE — 97010 HOT OR COLD PACKS THERAPY: CPT | Mod: GP | Performed by: PHYSICAL THERAPIST

## 2024-02-15 PROCEDURE — 97110 THERAPEUTIC EXERCISES: CPT | Mod: GP | Performed by: PHYSICAL THERAPIST

## 2024-02-16 NOTE — PROGRESS NOTES
Physical Therapy    Physical Therapy  and Treatment      Patient Name: Glenn Kerns  MRN: [id number]  Today's Date: 2/15/24  Visit 3       Assessment:  Glenn Kerns has a lateral component to his L LE radicular symptom and is reducible. His L LE sx is centralized and he has slight L hip pain 2/10   Plan:   Continue Extension POC progress to posterior chain and core activation.    Current Problem:   1. Lumbar radiculopathy            Subjective    Glenn Kerns having a bad day today. He had acupuncture 2 days ago and has been worse since. He has more severe L heel pain . The back brace tightened up help   Pain:   6-7/10        Objective   Observation:      Antalgic L  LE painful at L heel strike  Treatments:  Modality:  HP to lumbar region in prone 10 min  There ex: 25min  Delma extension approach prone positioning, flat to 2 pillow static extension  Standing R to L side glide to centralize L LE symptom  Prone press up 3x10 Including L to R side glide component  Recovery walk post reduction   Standing green tband row  both arms and alternate arms 2 x 15  Manual therapy: 15 min  Prone press up 3x10 Including L to R side glide component  EDUCATION:  2 pillow static prone progression     Goals:  1. Decrease pain LBP  to 0-3/10 severity level   2. Centralize bilat LE radicular symptom  3. Increase lumbar FWD bending to fingers to midshin and return to upright neutral pain free  4. Reduce lumbar soft tissue hypersensitivity by 50%  5. Improve oswestry score by 10%points

## 2024-02-19 ENCOUNTER — APPOINTMENT (OUTPATIENT)
Dept: PHYSICAL THERAPY | Facility: CLINIC | Age: 54
End: 2024-02-19
Payer: MEDICAID

## 2024-02-21 NOTE — PROGRESS NOTES
Physical Therapy    Physical Therapy  and Treatment      Patient Name: Glenn Kerns  MRN: [id number]  Today's Date: 2/22/24  Visit 4       Assessment:  Glenn Kerns has a lateral component to his L LE radicular symptom and is reducible. His L LE sx is centralized and he has slight L hip pain 2/10   Plan:   Continue Extension POC progress to posterior chain and core activation.    Current Problem:   1. Lumbar radiculopathy            Subjective    Glenn Kerns having a bad day today. He had acupuncture 2 days ago and has been worse since. He has more severe L heel pain . The back brace tightened up help   Pain:   6-7/10        Objective   Observation:      Antalgic L  LE painful at L heel strike  Treatments:  Modality:  HP to lumbar region in prone 10 min  There ex: 25min  Delma extension approach prone positioning, flat to 2 pillow static extension  Standing R to L side glide to centralize L LE symptom  Prone press up 3x10 Including L to R side glide component  Recovery walk post reduction   Standing green tband row  both arms and alternate arms 2 x 15  Manual therapy: 15 min  Prone press up 3x10 Including L to R side glide component  EDUCATION:  2 pillow static prone progression     Goals:  1. Decrease pain LBP  to 0-3/10 severity level   2. Centralize bilat LE radicular symptom  3. Increase lumbar FWD bending to fingers to midshin and return to upright neutral pain free  4. Reduce lumbar soft tissue hypersensitivity by 50%  5. Improve oswestry score by 10%points

## 2024-02-22 ENCOUNTER — APPOINTMENT (OUTPATIENT)
Dept: PHYSICAL THERAPY | Facility: CLINIC | Age: 54
End: 2024-02-22
Payer: MEDICAID

## 2024-02-23 NOTE — PROGRESS NOTES
Physical Therapy    Physical Therapy  and Treatment      Patient Name: Glenn Kerns  MRN: [id number]  Today's Date: 2/26/24  Visit 5  Time Calculation  Start Time: 1030  Stop Time: 1120  Time Calculation (min): 50 min    Assessment:  Glenn Kerns is progressing well with the Delma extension approach. His L LE sx is centralized most of the time and he can maneuver to centralize when it returns. We progressed him to prone hip extension to activate multifidi   Plan:   Continue Extension POC progress to posterior chain and core activation.    Current Problem:   1. Lumbar radiculopathy  Follow Up In Physical Therapy          Subjective    Glenn Kerns is getting better with L LE radicular symptom centralized and LBP 3/10  Pain:   3/10        Objective   Observation:    Glenn Kerns continue to move in very guarded posture when changing position on treatment table    Treatments:  Modality:  HP to lumbar region in prone 10 min  There ex: 25min  Delma extension approach prone positioning, flat to 2 pillow static extension  Standing R to L side glide to centralize L LE symptom  Supine on lumbar extension curve device 2 min   Prone hip extension to engage deep local lumbar extensors /multifi 2x10  Recovery walk post reduction   Standing green tband row  both arms and alternate arms 2 x 15  Manual therapy: 15 min  Prone press up 3x10   Lower thoracic PA glides for extension mobility  L mid thoracic TP pressure  EDUCATION:  Access Code: [email or web address]  URL: [email or web address]  Date: 02/26/2024  Prepared by: Anival Portillo    Exercises  - Prone Hip Extension  - 1 x daily - 7 x weekly - 2 sets - 10 reps    Goals:  1. Decrease pain LBP  to 0-3/10 severity level   2. Centralize bilat LE radicular symptom  3. Increase lumbar FWD bending to fingers to midshin and return to upright neutral pain free  4. Reduce lumbar soft tissue hypersensitivity by 50%  5. Improve oswestry score by 10%points

## 2024-02-26 ENCOUNTER — TREATMENT (OUTPATIENT)
Dept: PHYSICAL THERAPY | Facility: CLINIC | Age: 54
End: 2024-02-26
Payer: MEDICAID

## 2024-02-26 DIAGNOSIS — M54.16 LUMBAR RADICULOPATHY: Primary | ICD-10-CM

## 2024-02-26 PROCEDURE — 97140 MANUAL THERAPY 1/> REGIONS: CPT | Mod: GP | Performed by: PHYSICAL THERAPIST

## 2024-02-26 PROCEDURE — 97010 HOT OR COLD PACKS THERAPY: CPT | Mod: GP | Performed by: PHYSICAL THERAPIST

## 2024-02-26 PROCEDURE — 97110 THERAPEUTIC EXERCISES: CPT | Mod: GP | Performed by: PHYSICAL THERAPIST

## 2024-02-27 NOTE — PROGRESS NOTES
Physical Therapy    Physical Therapy  and Treatment      Patient Name: Glenn Kerns  MRN: [id number]  Today's Date: 2/28/24  Visit 6  Time Calculation  Start Time: 1000  Stop Time: 1045  Time Calculation (min): 45 min     PT Therapeutic Procedures Time Entry  Manual Therapy Time Entry: 15  Therapeutic Exercise Time Entry: 30                        Assessment:  Glenn Kerns is now able to progress to Closed kinetic chain  stabilization exercise to regain functional weightbearing activity tolerance including anti-rotation strain. No increase in leg symptom with increased exercise intensity  Plan:   Continue Extension POC progress to posterior chain and core activation.    Current Problem:   1. Lumbar radiculopathy  Follow Up In Physical Therapy          Subjective    Glenn Kerns has proximal muscle discomfort since last visit but no L E pain , and some numbness in R big toe. Overall moving more easily   Pain:   3/10        Objective   Observation:    Glenn Kerns is moving with less guarding e when changing position on treatment table    Treatments:    There ex: 30min  Done today  Quadruped alternating arm reach for suspended stabilization  x20  Quadruped cat/cow to regain awareness of lumbopelvic mobility in pain free range  Standing Anti-rotation green ttube 5 x 15 sec each side  Recumbent stepper 8 min RPM 75  Standing green tband row  both arms and alternate arms 2 x 15  Prone hip extension to engage deep local lumbar extensors /multifi 2x10  Recovery walk post reduction   Not done today  Delma extension approach prone positioning, flat to 2 pillow static extension  Standing R to L side glide to centralize L LE symptom  Supine on lumbar extension curve device 2 min       Manual therapy: 15 min  Prone press up 3x10   Lower thoracic PA glides for extension mobility  L mid thoracic TP pressure  EDUCATION:  .quadruped progression initiated  see written hep scanned     Goals:  1. Decrease pain LBP  to 0-3/10 severity  level   2. Centralize bilat LE radicular symptom  3. Increase lumbar FWD bending to fingers to midshin and return to upright neutral pain free  4. Reduce lumbar soft tissue hypersensitivity by 50%  5. Improve oswestry score by 10%points

## 2024-02-28 ENCOUNTER — TREATMENT (OUTPATIENT)
Dept: PHYSICAL THERAPY | Facility: CLINIC | Age: 54
End: 2024-02-28
Payer: MEDICAID

## 2024-02-28 DIAGNOSIS — M54.16 LUMBAR RADICULOPATHY: Primary | ICD-10-CM

## 2024-02-28 PROCEDURE — 97140 MANUAL THERAPY 1/> REGIONS: CPT | Mod: GP | Performed by: PHYSICAL THERAPIST

## 2024-02-28 PROCEDURE — 97110 THERAPEUTIC EXERCISES: CPT | Mod: GP | Performed by: PHYSICAL THERAPIST

## 2024-03-04 ENCOUNTER — TREATMENT (OUTPATIENT)
Dept: PHYSICAL THERAPY | Facility: CLINIC | Age: 54
End: 2024-03-04
Payer: MEDICAID

## 2024-03-04 DIAGNOSIS — M54.16 LUMBAR RADICULOPATHY: Primary | ICD-10-CM

## 2024-03-04 PROCEDURE — 97110 THERAPEUTIC EXERCISES: CPT | Mod: GP | Performed by: PHYSICAL THERAPIST

## 2024-03-04 PROCEDURE — 97140 MANUAL THERAPY 1/> REGIONS: CPT | Mod: GP | Performed by: PHYSICAL THERAPIST

## 2024-03-04 NOTE — PROGRESS NOTES
Physical Therapy    Physical Therapy  and Treatment      Patient Name: Glenn Kerns  MRN: [id number]  Today's Date: 3/4/24  Visit 6  Time Calculation  Start Time: 1430  Stop Time: 1520  Time Calculation (min): 50 min     Assessment:  Glenn Kerns is now able to progress to Closed kinetic chain  stabilization exercise to regain functional weightbearing activity tolerance including anti-rotation strain. No increase in leg symptom with increased exercise intensity  Plan:   Continue Extension POC progress to posterior chain and core activation.    Current Problem:   1. Lumbar radiculopathy  Follow Up In Physical Therapy          Subjective    Glenn Kerns notes that he had about of L LBP and proximal posterior leg symptom walking today and bilat distal  symptom. Better now. R toe numb.  Pain:   5/10        Objective   Observation:    Glenn Kerns is moving with less guarding e when changing position on treatment table    Treatments:  HP prone lumbar 10 min  There ex: 30min  Done today  Quadruped alternating arm reach for suspended stabilization  x20  Quadruped cat/cow to regain awareness of lumbopelvic mobility in pain free range  Standing Anti-rotation green ttube 5 x 15 sec each side  Recumbent stepper 8 min RPM 75  Standing green tband row  both arms and alternate arms 2 x 15  Prone hip extension to engage deep local lumbar extensors /multifi 2x10 ( needed PA lumbar stabilization support )  Recovery walk post reduction   Not done today  Delma extension approach prone positioning, flat to 2 pillow static extension  Standing R to L side glide to centralize L LE symptom  Supine on lumbar extension curve device 2 min     Manual therapy: 15 min  Prone press up 3x10   Lower thoracic PA glides for extension mobility  L mid thoracic TP pressure  EDUCATION:  .quadruped progression initiated  see written hep scanned     Goals:  1. Decrease pain LBP  to 0-3/10 severity level   2. Centralize bilat LE radicular symptom  3.  Increase lumbar FWD bending to fingers to midshin and return to upright neutral pain free  4. Reduce lumbar soft tissue hypersensitivity by 50%  5. Improve oswestry score by 10%points

## 2024-03-08 NOTE — PROGRESS NOTES
Physical Therapy    Physical Therapy  and Treatment      Patient Name: Glenn Kerns  MRN: [id number]  Today's Date: 3/11/24  Visit 7  Time Calculation  Start Time: 0830  Stop Time: 0845  Time Calculation (min): 15 min       PT Therapeutic Procedures Time Entry  Manual Therapy Time Entry: 10  Therapeutic Exercise Time Entry: 35           Assessment:  Glenn Kerns is able to progress to tall kneeling static strength with 10 lbs. He was unable to perform bird dog stabilization secondary to wrist pain. He is still very cautious with all transitional movements off treatment table , from stand to ground to stand, and hip hinging. LE radicular pain is staying centralized   Plan:   Continue Extension POC progress to posterior chain and core activation. Working on fundamental movement patterns    Current Problem:   1. Lumbar radiculopathy  Follow Up In Physical Therapy          Subjective    Glenn Kerns feeling better. He took a hot shower and that relaxed hip. No LE radicular pain but he does have R big   Pain:   2/10        Objective   Observation:    Glenn Kerns is moving with less guarding e when changing position on treatment table    Treatments:  HP prone lumbar 10 min  There ex: 30min  Done today  Delma extension approach prone positioning, flat to 2 pillow static extension  Quadruped bird dog modified 2x10  Quadruped cat/cow to regain awareness of lumbopelvic mobility in pain free range  Standing Anti-rotation green ttube 5 x 15 sec each side  Recumbent stepper 8 min RPM 75  Standing green tband row  both arms and alternate arms 2 x 15  Tall kneeling loaded in all position 10 lbs 20 sec each x 2  Recovery walk post reduction   Not done today    Standing R to L side glide to centralize L LE symptom  Supine on lumbar extension curve device 2 min     Manual therapy: 15 min  Prone press up 3x10     EDUCATION:   Progress to tall kneeling    Goals:  1. Decrease pain LBP  to 0-3/10 severity level   2. Centralize bilat LE  radicular symptom  3. Increase lumbar FWD bending to fingers to midshin and return to upright neutral pain free  4. Reduce lumbar soft tissue hypersensitivity by 50%  5. Improve oswestry score by 10%points

## 2024-03-11 ENCOUNTER — TREATMENT (OUTPATIENT)
Dept: PHYSICAL THERAPY | Facility: CLINIC | Age: 54
End: 2024-03-11
Payer: MEDICAID

## 2024-03-11 DIAGNOSIS — M54.16 LUMBAR RADICULOPATHY: Primary | ICD-10-CM

## 2024-03-11 PROCEDURE — 97110 THERAPEUTIC EXERCISES: CPT | Mod: GP | Performed by: PHYSICAL THERAPIST

## 2024-03-11 PROCEDURE — 97140 MANUAL THERAPY 1/> REGIONS: CPT | Mod: GP | Performed by: PHYSICAL THERAPIST

## 2024-03-26 ENCOUNTER — OFFICE VISIT (OUTPATIENT)
Dept: PRIMARY CARE | Facility: CLINIC | Age: 54
End: 2024-03-26
Payer: MEDICAID

## 2024-03-26 VITALS
HEART RATE: 69 BPM | RESPIRATION RATE: 18 BRPM | OXYGEN SATURATION: 96 % | HEIGHT: 70 IN | DIASTOLIC BLOOD PRESSURE: 82 MMHG | SYSTOLIC BLOOD PRESSURE: 126 MMHG | TEMPERATURE: 97 F | BODY MASS INDEX: 32.78 KG/M2 | WEIGHT: 229 LBS

## 2024-03-26 DIAGNOSIS — S22.080S COMPRESSION FRACTURE OF T11 VERTEBRA, SEQUELA: ICD-10-CM

## 2024-03-26 DIAGNOSIS — Z12.5 PROSTATE CANCER SCREENING: ICD-10-CM

## 2024-03-26 DIAGNOSIS — K64.2 GRADE III HEMORRHOIDS: ICD-10-CM

## 2024-03-26 DIAGNOSIS — M25.561 PAIN OF RIGHT KNEE AND LOWER LEG: Primary | ICD-10-CM

## 2024-03-26 DIAGNOSIS — Z13.820 SCREENING FOR OSTEOPOROSIS: ICD-10-CM

## 2024-03-26 DIAGNOSIS — E55.9 HYPOVITAMINOSIS D: ICD-10-CM

## 2024-03-26 DIAGNOSIS — Z00.00 HEALTHCARE MAINTENANCE: ICD-10-CM

## 2024-03-26 DIAGNOSIS — M79.661 PAIN OF RIGHT KNEE AND LOWER LEG: Primary | ICD-10-CM

## 2024-03-26 DIAGNOSIS — Z11.4 SCREENING FOR HIV (HUMAN IMMUNODEFICIENCY VIRUS): ICD-10-CM

## 2024-03-26 DIAGNOSIS — Z11.59 NEED FOR HEPATITIS C SCREENING TEST: ICD-10-CM

## 2024-03-26 PROCEDURE — 99213 OFFICE O/P EST LOW 20 MIN: CPT | Performed by: INTERNAL MEDICINE

## 2024-03-26 RX ORDER — HYDROCORTISONE 25 MG/G
CREAM TOPICAL 2 TIMES DAILY
Qty: 30 G | Refills: 3 | Status: SHIPPED | OUTPATIENT
Start: 2024-03-26

## 2024-03-26 ASSESSMENT — ENCOUNTER SYMPTOMS
SHORTNESS OF BREATH: 0
CHEST TIGHTNESS: 0
SORE THROAT: 0
NAUSEA: 0
DIARRHEA: 0
SINUS PRESSURE: 0
AGITATION: 0
PALPITATIONS: 0
WEAKNESS: 0
ACTIVITY CHANGE: 0
MYALGIAS: 0
CHILLS: 0

## 2024-03-26 ASSESSMENT — PAIN SCALES - GENERAL: PAINLEVEL: 0-NO PAIN

## 2024-03-26 NOTE — PROGRESS NOTES
Primary care office Note- Dr. Vineet Bernard      Name: Glenn Kerns, Age: 54 y.o., Gender: male, MRN: [id number]   Pharmacy:   [id number] - [place], [place] - [address]  AT [facility]  [address] [facility] [place] [address]  Phone: [phone] Fax: [phone]    [id number] [place] - [address] AT [facility]  [address]  [place] [address]  Phone: [phone] Fax: [phone]     PCP: Vineet Bernard        Subjective:     Chief Complaint   Patient presents with    Follow-up       HPI:   Glenn Kerns is a 54 y.o. male that presents for history of a bulging disc and he went to pain management and is avoiding an MRI due to high sounds.  Prolonged sitting causes back pain.  He also got a knee pain after injuring it a couple of years ago. He stooped down a few weeks ago and has a burning on the medial side of knee joint. He gained 8 pounds since the last visit.      ROS  Review of Systems   Constitutional:  Negative for activity change and chills.   HENT:  Negative for congestion, sinus pressure and sore throat.    Respiratory:  Negative for chest tightness and shortness of breath.    Cardiovascular:  Negative for chest pain and palpitations.   Gastrointestinal:  Negative for diarrhea and nausea.   Musculoskeletal:  Negative for myalgias.   Neurological:  Negative for weakness.   Psychiatric/Behavioral:  Negative for agitation and behavioral problems.         Medical History      PMH:    has a past medical history of Personal history of other diseases of the digestive system (02/03/2022), Personal history of other endocrine, nutritional and metabolic disease (02/03/2022), and Personal history of other infectious and parasitic diseases (02/03/2022).   Allergies:   Allergies   Allergen Reactions    Lactase Unknown     Test results regarding Chinese medicine diagnosis.      Surgical Hx:   History reviewed. No pertinent surgical  "history.   Social HX:   Social History     Tobacco Use    Smoking status: Never    Smokeless tobacco: Never   Vaping Use    Vaping Use: Never used   Substance Use Topics    Alcohol use: Yes     Alcohol/week: 4.0 standard drinks of alcohol     Types: 4 Cans of beer per week        MEDS:   Current Outpatient Medications   Medication Instructions    benzonatate (TESSALON) 200 mg, oral, 3 times daily PRN    clotrimazole (Lotrimin) 1 % external solution Topical, 2 times daily    doxycycline (MONODOX) 100 mg, oral, 2 times daily    hydrocortisone (Anusol-HC) 2.5 % rectal cream rectal, 2 times daily    hydrocortisone (ANUSOL-HC) 25 mg, rectal, Daily    ketoconazole (NIZOral) 2 % cream Topical, 2 times daily    levothyroxine (SYNTHROID, LEVOXYL) 112 mcg, oral, Daily before breakfast        Objective Data     Objective:   Visit Vitals  /82   Pulse 69   Temp 36.1 °C (97 °F)   Resp 18        Physical Examination:   Physical Exam  Constitutional:       Appearance: Normal appearance.   HENT:      Head: Normocephalic and atraumatic.      Nose: Nose normal.      Mouth/Throat:      Mouth: Mucous membranes are moist.   Eyes:      Extraocular Movements: Extraocular movements intact.      Pupils: Pupils are equal, round, and reactive to light.   Cardiovascular:      Rate and Rhythm: Normal rate and regular rhythm.   Pulmonary:      Effort: No respiratory distress.      Breath sounds: No wheezing.   Abdominal:      General: Bowel sounds are normal.      Palpations: Abdomen is soft.   Neurological:      General: No focal deficit present.          Last Labs:   CBC:   No results found for: \"WBC\", \"HGB\", \"MCV\", \"PLT\"   CMP:   Sodium   Date Value Ref Range Status   04/17/2023 140 136 - 145 mmol/L Final   03/24/2022 140 136 - 145 mmol/L Final     Potassium   Date Value Ref Range Status   04/17/2023 3.8 3.5 - 5.3 mmol/L Final   03/24/2022 4.0 3.5 - 5.3 mmol/L Final     Chloride   Date Value Ref Range Status   04/17/2023 103 98 - 107 " "mmol/L Final   03/24/2022 102 98 - 107 mmol/L Final     Urea Nitrogen   Date Value Ref Range Status   04/17/2023 15 6 - 23 mg/dL Final   03/24/2022 14 6 - 23 mg/dL Final     Creatinine   Date Value Ref Range Status   04/17/2023 1.02 0.50 - 1.30 mg/dL Final   03/24/2022 1.14 0.50 - 1.30 mg/dL Final      A1c:   Hemoglobin A1C   Date Value Ref Range Status   10/10/2023 5.3 see below % Final        Other labs;   Vit D:   No results found for: \"VITD25\"   TSH:  TSH   Date Value Ref Range Status   04/17/2023 1.63 0.44 - 3.98 mIU/L Final     Comment:      TSH testing is performed using different testing    methodology at [facility] than at other    [facility]. Direct result comparisons should    only be made within the same method.   03/24/2022 2.44 0.44 - 3.98 mIU/L Final     Comment:      TSH testing is performed using different testing    methodology at [facility] than at other    [facility]. Direct result comparisons should    only be made within the same method.          Assessment and Plan     Problem List Items Addressed This Visit       Compression fracture of T11 vertebra (CMS/HCC)    Hemorrhoids    Relevant Medications    hydrocortisone (Anusol-HC) 2.5 % rectal cream    Pain of right knee and lower leg - Primary    Relevant Orders    Referral to Physical Therapy     Other Visit Diagnoses       Screening for HIV (human immunodeficiency virus)        Relevant Orders    HIV 1/2 Antigen/Antibody Screen with Reflex to Confirmation    Screening for osteoporosis        Relevant Orders    XR DEXA bone density    Healthcare maintenance        Relevant Orders    Lipid Panel    Comprehensive Metabolic Panel    Need for hepatitis C screening test        Relevant Orders    Hepatitis C Antibody    Hypovitaminosis D        Relevant Orders    Vitamin D 25-Hydroxy,Total (for eval of Vitamin D levels)    Prostate cancer screening        Relevant Orders    Prostate Specific Antigen         "   We discussed a number of issues the patient was reassured in multiple ways.  We will refer him to physical therapy to address his knee pain.  We will get a follow-up bone density scan to see if there has been any improvement.  Will also check a vitamin D level.  Vineet Bernard MD

## 2024-03-27 NOTE — PROGRESS NOTES
Physical Therapy    Physical Therapy  and Treatment      Patient Name: Glenn Kerns  MRN: [id number]  Today's Date: 3/28/24  Visit 8  Time Calculation  Start Time: 0900  Stop Time: 0945  Time Calculation (min): 45 min       PT Therapeutic Procedures Time Entry  Neuromuscular Re-Education Time Entry: 30  Self-Care/Home Mgmt Training: 10           Assessment:  I opted to treat Glenn with Intention relaxation tone reduction and desensitization technique to help bring him awareness of his total body pain sensitivity and muscle hypertonus which interferes with good fundamental movement capacity and perpetuates his pain. Post session His R knee felt looser and a sense of general relaxation.  Plan:   We will do his knee evaluation per new order.  Current Problem:   1. Lumbar radiculopathy  Follow Up In Physical Therapy          Subjective    Glenn Kerns c/o R hamstring soreness . He has L heel and gr toe numbness . His R knee is painful and he c/o of the knee locking up  Pain:   4/10        Objective   Observation:    Glenn Kerns has hypersensitive reaction to palpation in all body parts  Treatments:  Neuro re-ed  Intention relaxation technique to reduce lumbar hypertonus and offer general total body desesitization in supine  EDUCATION:  Self management regarding rest and recovery cycles for R knee joint load management to minimize pain and stiffness secondary to prolonged positions  Not done today  Delma extension approach prone positioning, flat to 2 pillow static extension  Quadruped bird dog modified 2x10  Quadruped cat/cow to regain awareness of lumbopelvic mobility in pain free range  Standing Anti-rotation green ttube 5 x 15 sec each side  Recumbent stepper 8 min RPM 75  Standing green tband row  both arms and alternate arms 2 x 15  Tall kneeling loaded in all position 10 lbs 20 sec each x 2  Recovery walk post reduction   standing R to L side glide to centralize L LE symptom  Supine on lumbar extension curve  device 2 min     Manual therapy: 15 min  Prone press up 3x10         Goals:  1. Decrease pain LBP  to 0-3/10 severity level   2. Centralize bilat LE radicular symptom  3. Increase lumbar FWD bending to fingers to midshin and return to upright neutral pain free  4. Reduce lumbar soft tissue hypersensitivity by 50%  5. Improve oswestry score by 10%points

## 2024-03-28 ENCOUNTER — TREATMENT (OUTPATIENT)
Dept: PHYSICAL THERAPY | Facility: CLINIC | Age: 54
End: 2024-03-28
Payer: MEDICAID

## 2024-03-28 DIAGNOSIS — M54.16 LUMBAR RADICULOPATHY: Primary | ICD-10-CM

## 2024-03-28 PROCEDURE — 97112 NEUROMUSCULAR REEDUCATION: CPT | Mod: GP | Performed by: PHYSICAL THERAPIST

## 2024-03-28 PROCEDURE — 97535 SELF CARE MNGMENT TRAINING: CPT | Mod: GP | Performed by: PHYSICAL THERAPIST

## 2024-04-03 NOTE — PROGRESS NOTES
Physical Therapy    Physical Therapy Evaluation and Treatment      Patient Name: Glenn Kerns  MRN: [id number]  Today's Date: 4/4/2024  Visit 1  Time Calculation  Start Time: 0915  Stop Time: 1005  Time Calculation (min): 50 min  PT Evaluation Time Entry  PT Evaluation (Low) Time Entry: 30    PT Therapeutic Procedures Time Entry  Therapeutic Exercise Time Entry: 20       Assessment:      Patient presents with clinical signs and symptoms R  knee meniscus tear.   This has been a chronic problem over 20 years.  These impairments affect ADLs, work, recreational activity, exercise, transfer ability, ambulation, and sleep function that requires skilled PT intervention to resolve and enable patient to return to previous level of function. Factors that may affect progress in PT are chronic pain, obesity, medical co-morbidities, and patient compliance.  Patient response to initial treatment of  education and LE strengthening exercises  was understood and performed well.  Plan:  OP PT Plan  Treatment/Interventions: Cryotherapy, Education/ Instruction, Self care/ home management, Therapeutic activities, Therapeutic exercises  PT Plan: Skilled PT  PT Frequency: 1 time per week  Duration: 12  Onset Date: 01/01/20  Certification Period Start Date: 04/04/24  Certification Period End Date: 07/03/24  Rehab Potential: Fair  Plan of Care Agreement: Patient  We will discharge lumbar PT POC and now treat the R knee in this episode of care  Current Problem:   1. Lumbar radiculopathy        2. Pain of right knee and lower leg  Referral to Physical Therapy      3. Difficulty in walking involving lower leg joint            Subjective    R knee pain since injury in HS. He has had several bouts of R knee pain and locking . He is worse when he stands for long periods and get stiff and painful medial knee. He has used stretching, heat and a brace. He had acupuncture a few days ago and that helped reduce pain a bit. He has done well on a extension  approach for his back.     Pain:   Medial R knee pain 2/10 up to 8/10  Home Living:   Lives alone  Prior Level of Function:  Prior Function Per Pt/Caregiver Report  Hand Dominance: Rightindependent ADLs    Objective   Cognition:   A+Ox3  Observation:    Hip and ankle joint clearance:  Clear  Knee ROM: Flexion  AROM  R 120 P  L 132 deg   Extension  AROM  R  L  deg      Knee Strength:  Flex  R 3+/5 P    Ext R 3+ /5     Hip Strength:  Abduction  R  /5  L  /5           Extension  R /5    L  /5         Flexion R  /5   L  /5    Ligament testing:  Lachman's  R  L   medial collateral    R  L  lateral collateral Neg                                    R     Mariah's   R      +  Palpation: tenderness to medial/ joint line     patella perimeter     patellar tendon  quadrucep tendon       Gait: antalgic R LE R lateral sway    Swelling   circumference:  joint effusion  no        Special Tests:   SLR quad lag   yes     squat strategy : knee dominant   and painful    Outcome Measures:  LEFS: 17    Treatments:  There Ex:  15 sec SLR 3x 3  Partial range PB 2x 5 with adductor squeeze  EDUCATION:   extensive education regarding mechanism of injury, relevant functional anatomy, treatment program rational, self management, HEP, and POC       Goals:    1. Decrease pain to 0-3/10 severity level   2. Increase   R Knee flexion AROM to   125 deg pain free  3. Increase knee extension strength to   4/5 MMT grade pain free  4. Improve ambulation ability to normalized on level surface and steps   5. Improve out come score by 15 points  6. independent Home exercise program

## 2024-04-04 ENCOUNTER — EVALUATION (OUTPATIENT)
Dept: PHYSICAL THERAPY | Facility: CLINIC | Age: 54
End: 2024-04-04
Payer: MEDICAID

## 2024-04-04 DIAGNOSIS — M25.561 PAIN OF RIGHT KNEE AND LOWER LEG: ICD-10-CM

## 2024-04-04 DIAGNOSIS — M79.661 PAIN OF RIGHT KNEE AND LOWER LEG: ICD-10-CM

## 2024-04-04 DIAGNOSIS — M54.16 LUMBAR RADICULOPATHY: ICD-10-CM

## 2024-04-04 DIAGNOSIS — R26.2 DIFFICULTY IN WALKING INVOLVING LOWER LEG JOINT: Primary | ICD-10-CM

## 2024-04-04 PROCEDURE — 97110 THERAPEUTIC EXERCISES: CPT | Mod: GP | Performed by: PHYSICAL THERAPIST

## 2024-04-04 PROCEDURE — 97161 PT EVAL LOW COMPLEX 20 MIN: CPT | Mod: GP | Performed by: PHYSICAL THERAPIST

## 2024-04-04 ASSESSMENT — ENCOUNTER SYMPTOMS
OCCASIONAL FEELINGS OF UNSTEADINESS: 0
DEPRESSION: 0
LOSS OF SENSATION IN FEET: 1

## 2024-04-10 NOTE — PROGRESS NOTES
Physical Therapy    Physical Therapy Evaluation and Treatment      Patient Name: Glenn Kerns  MRN: [id number]  Today's Date: 4/11/24  Visit 2  Time Calculation  Start Time: 0920  Stop Time: 1000  Time Calculation (min): 40 min       PT Therapeutic Procedures Time Entry  Therapeutic Exercise Time Entry: 40       Assessment:    Glenn Kerns has general total body body hypersensitivity and fluctuating pain patterns. He does have clear clinical presentations of R knee meniscus tear and lumbar disc derangement, but also benign pain from moving into available ranges and hypersensitivity to palpation to all areas of the body. We are trying to get Glenn Kerns to move in pain tolerable ways enough to stimulate a training effect without hammering his pain so much.    Plan:   Continue modify exercise program to minimize pain and improve general durability considering back and knee issues.    Current Problem:   1. Lumbar radiculopathy        2. Pain of right knee and lower leg        3. Difficulty in walking involving lower leg joint            Subjective    Glenn Kerns had acupuncture on R knee on Tuesday and he still has some pain reduction  Pain:   Medial R knee pain 2/10 up to 8/10      Objective       Treatments:  There Ex:  Standing anti-rotation with green tband correction 2 x 15 sec  15 sec SLR 3x 3  Partial range PB 2x 5 with adductor squeeze  Nustep 8 min  Kickstand stance 10 sec each x 5  EDUCATION:   Extensive discussion regarding chronic pain and need to perform general total body exercise to improve activity tolerance and manage pain.      Goals:    1. Decrease pain to 0-3/10 severity level   2. Increase   R Knee flexion AROM to   125 deg pain free  3. Increase knee extension strength to   4/5 MMT grade pain free  4. Improve ambulation ability to normalized on level surface and steps   5. Improve out come score by 15 points  6. independent Home exercise program

## 2024-04-11 ENCOUNTER — TREATMENT (OUTPATIENT)
Dept: PHYSICAL THERAPY | Facility: CLINIC | Age: 54
End: 2024-04-11
Payer: MEDICAID

## 2024-04-11 DIAGNOSIS — R26.2 DIFFICULTY IN WALKING INVOLVING LOWER LEG JOINT: ICD-10-CM

## 2024-04-11 DIAGNOSIS — M79.661 PAIN OF RIGHT KNEE AND LOWER LEG: ICD-10-CM

## 2024-04-11 DIAGNOSIS — M25.561 PAIN OF RIGHT KNEE AND LOWER LEG: ICD-10-CM

## 2024-04-11 DIAGNOSIS — M54.16 LUMBAR RADICULOPATHY: Primary | ICD-10-CM

## 2024-04-11 PROCEDURE — 97110 THERAPEUTIC EXERCISES: CPT | Mod: GP | Performed by: PHYSICAL THERAPIST

## 2024-04-17 NOTE — PROGRESS NOTES
Physical Therapy    Physical Therapy Evaluation and Treatment      Patient Name: Glenn Kerns  MRN: [id number]  Today's Date: 4/18/24  Visit 3  Time Calculation  Start Time: 0915  Stop Time: 1005  Time Calculation (min): 50 min       PT Therapeutic Procedures Time Entry  Therapeutic Exercise Time Entry: 50       Assessment:  Glenn Kerns has less pain than usual and we increased exercise intensity a bit to get a greater training response    Plan:   Continue modify exercise program to minimize pain and improve general durability considering back and knee issues.    Current Problem:   1. Pain of right knee and lower leg        2. Difficulty in walking involving lower leg joint            Subjective    Glenn Kerns had acupuncture on R knee on Tuesday and feels much better.    Medial R knee pain 0/10, No radiating L LE symptom    Objective       Treatments:  There Ex:  Standing anti-rotation with green tband  2 x 15 sec  Standing KB front, side, and back hold 15 sec each 15 lbs x3  15 sec SLR 3x 3  Tball rollout 2 x10  Partial range PB 3x 10 with adductor squeeze  Nustep 8 min at 60 RPM  Kickstand stance 10 sec each x 5  EDUCATION:   Extensive discussion regarding chronic pain and need to perform general total body exercise to improve activity tolerance and manage pain.      Goals:    1. Decrease pain to 0-3/10 severity level   2. Increase   R Knee flexion AROM to   125 deg pain free  3. Increase knee extension strength to   4/5 MMT grade pain free  4. Improve ambulation ability to normalized on level surface and steps   5. Improve out come score by 15 points  6. independent Home exercise program

## 2024-04-18 ENCOUNTER — TREATMENT (OUTPATIENT)
Dept: PHYSICAL THERAPY | Facility: CLINIC | Age: 54
End: 2024-04-18
Payer: MEDICAID

## 2024-04-18 DIAGNOSIS — R26.2 DIFFICULTY IN WALKING INVOLVING LOWER LEG JOINT: ICD-10-CM

## 2024-04-18 DIAGNOSIS — M79.661 PAIN OF RIGHT KNEE AND LOWER LEG: Primary | ICD-10-CM

## 2024-04-18 DIAGNOSIS — M25.561 PAIN OF RIGHT KNEE AND LOWER LEG: Primary | ICD-10-CM

## 2024-04-18 PROCEDURE — 97110 THERAPEUTIC EXERCISES: CPT | Mod: GP | Performed by: PHYSICAL THERAPIST

## 2024-04-22 ENCOUNTER — APPOINTMENT (OUTPATIENT)
Dept: PRIMARY CARE | Facility: CLINIC | Age: 54
End: 2024-04-22
Payer: MEDICAID

## 2024-04-22 ENCOUNTER — LAB (OUTPATIENT)
Dept: LAB | Facility: LAB | Age: 54
End: 2024-04-22
Payer: MEDICAID

## 2024-04-22 DIAGNOSIS — Z11.59 NEED FOR HEPATITIS C SCREENING TEST: ICD-10-CM

## 2024-04-22 DIAGNOSIS — Z11.4 SCREENING FOR HIV (HUMAN IMMUNODEFICIENCY VIRUS): ICD-10-CM

## 2024-04-22 DIAGNOSIS — E55.9 HYPOVITAMINOSIS D: ICD-10-CM

## 2024-04-22 DIAGNOSIS — Z00.00 HEALTHCARE MAINTENANCE: ICD-10-CM

## 2024-04-22 DIAGNOSIS — Z12.5 PROSTATE CANCER SCREENING: ICD-10-CM

## 2024-04-22 LAB
25(OH)D3 SERPL-MCNC: 21 NG/ML (ref 30–100)
ALBUMIN SERPL BCP-MCNC: 4.6 G/DL (ref 3.4–5)
ALP SERPL-CCNC: 45 U/L (ref 33–120)
ALT SERPL W P-5'-P-CCNC: 31 U/L (ref 10–52)
ANION GAP SERPL CALC-SCNC: 13 MMOL/L (ref 10–20)
AST SERPL W P-5'-P-CCNC: 22 U/L (ref 9–39)
BILIRUB SERPL-MCNC: 0.8 MG/DL (ref 0–1.2)
BUN SERPL-MCNC: 15 MG/DL (ref 6–23)
CALCIUM SERPL-MCNC: 9 MG/DL (ref 8.6–10.6)
CHLORIDE SERPL-SCNC: 103 MMOL/L (ref 98–107)
CHOLEST SERPL-MCNC: 196 MG/DL (ref 0–199)
CHOLESTEROL/HDL RATIO: 4.5
CO2 SERPL-SCNC: 29 MMOL/L (ref 21–32)
CREAT SERPL-MCNC: 1.13 MG/DL (ref 0.5–1.3)
EGFRCR SERPLBLD CKD-EPI 2021: 77 ML/MIN/1.73M*2
GLUCOSE SERPL-MCNC: 86 MG/DL (ref 74–99)
HCV AB SER QL: NONREACTIVE
HDLC SERPL-MCNC: 44 MG/DL
HIV 1+2 AB+HIV1 P24 AG SERPL QL IA: NONREACTIVE
LDLC SERPL CALC-MCNC: 117 MG/DL
NON HDL CHOLESTEROL: 152 MG/DL (ref 0–149)
POTASSIUM SERPL-SCNC: 3.7 MMOL/L (ref 3.5–5.3)
PROT SERPL-MCNC: 6.8 G/DL (ref 6.4–8.2)
PSA SERPL-MCNC: 0.69 NG/ML
SODIUM SERPL-SCNC: 141 MMOL/L (ref 136–145)
TRIGL SERPL-MCNC: 175 MG/DL (ref 0–149)
VLDL: 35 MG/DL (ref 0–40)

## 2024-04-22 PROCEDURE — 86803 HEPATITIS C AB TEST: CPT

## 2024-04-22 PROCEDURE — 87389 HIV-1 AG W/HIV-1&-2 AB AG IA: CPT

## 2024-04-22 PROCEDURE — 80061 LIPID PANEL: CPT

## 2024-04-22 PROCEDURE — 80053 COMPREHEN METABOLIC PANEL: CPT

## 2024-04-22 PROCEDURE — 82306 VITAMIN D 25 HYDROXY: CPT

## 2024-04-22 PROCEDURE — 36415 COLL VENOUS BLD VENIPUNCTURE: CPT

## 2024-04-22 PROCEDURE — 84153 ASSAY OF PSA TOTAL: CPT

## 2024-04-25 ENCOUNTER — OFFICE VISIT (OUTPATIENT)
Dept: PRIMARY CARE | Facility: CLINIC | Age: 54
End: 2024-04-25
Payer: MEDICAID

## 2024-04-25 VITALS
WEIGHT: 230 LBS | BODY MASS INDEX: 32.93 KG/M2 | RESPIRATION RATE: 18 BRPM | HEIGHT: 70 IN | DIASTOLIC BLOOD PRESSURE: 68 MMHG | HEART RATE: 72 BPM | TEMPERATURE: 97.6 F | SYSTOLIC BLOOD PRESSURE: 133 MMHG | OXYGEN SATURATION: 97 %

## 2024-04-25 DIAGNOSIS — E03.9 HYPOTHYROIDISM, UNSPECIFIED TYPE: ICD-10-CM

## 2024-04-25 DIAGNOSIS — Z00.00 HEALTHCARE MAINTENANCE: Primary | ICD-10-CM

## 2024-04-25 DIAGNOSIS — Z12.83 SKIN CANCER SCREENING: ICD-10-CM

## 2024-04-25 DIAGNOSIS — Z12.11 COLON CANCER SCREENING: ICD-10-CM

## 2024-04-25 DIAGNOSIS — E78.2 HYPERLIPIDEMIA, MIXED: ICD-10-CM

## 2024-04-25 PROBLEM — B35.3 TINEA PEDIS OF BOTH FEET: Status: ACTIVE | Noted: 2024-04-25

## 2024-04-25 PROBLEM — E06.3 HASHIMOTO'S THYROIDITIS: Status: ACTIVE | Noted: 2024-04-25

## 2024-04-25 PROBLEM — H93.239 HYPERACUSIS: Status: ACTIVE | Noted: 2024-04-25

## 2024-04-25 PROCEDURE — 1036F TOBACCO NON-USER: CPT | Performed by: INTERNAL MEDICINE

## 2024-04-25 PROCEDURE — 99212 OFFICE O/P EST SF 10 MIN: CPT | Performed by: INTERNAL MEDICINE

## 2024-04-25 PROCEDURE — 99396 PREV VISIT EST AGE 40-64: CPT | Performed by: INTERNAL MEDICINE

## 2024-04-25 RX ORDER — LEVOTHYROXINE SODIUM 112 UG/1
112 TABLET ORAL
Qty: 90 TABLET | Refills: 3 | Status: SHIPPED | OUTPATIENT
Start: 2024-04-25 | End: 2025-04-25

## 2024-04-25 RX ORDER — CLOTRIMAZOLE AND BETAMETHASONE DIPROPIONATE 10; .64 MG/G; MG/G
1 CREAM TOPICAL 2 TIMES DAILY
COMMUNITY
Start: 2024-01-08

## 2024-04-25 ASSESSMENT — ENCOUNTER SYMPTOMS
CHEST TIGHTNESS: 0
LOSS OF SENSATION IN FEET: 0
ACTIVITY CHANGE: 0
MYALGIAS: 0
PALPITATIONS: 0
SORE THROAT: 0
DIARRHEA: 0
DEPRESSION: 0
WEAKNESS: 0
SHORTNESS OF BREATH: 0
NAUSEA: 0
AGITATION: 0
SINUS PRESSURE: 0
OCCASIONAL FEELINGS OF UNSTEADINESS: 0
CHILLS: 0

## 2024-04-25 ASSESSMENT — PAIN SCALES - GENERAL: PAINLEVEL: 0-NO PAIN

## 2024-04-25 NOTE — PROGRESS NOTES
"Subjective   Patient ID: Glenn Kerns is a 54 y.o. male who presents for Annual Exam.  He had colon polyps three years ago. He gets a little swelling on his ankles but we decided not to go on a water. Still worried about a bulging disc at L4-L5 but fills better overall.      HPI     Review of Systems   Constitutional:  Negative for activity change and chills.   HENT:  Negative for congestion, sinus pressure and sore throat.    Respiratory:  Negative for chest tightness and shortness of breath.    Cardiovascular:  Negative for chest pain and palpitations.   Gastrointestinal:  Negative for diarrhea and nausea.   Musculoskeletal:  Negative for myalgias.   Neurological:  Negative for weakness.   Psychiatric/Behavioral:  Negative for agitation and behavioral problems.        Objective   /68   Pulse 72   Temp 36.4 °C (97.6 °F)   Resp 18   Ht 1.778 m (5' 10\")   Wt 104 kg (230 lb)   SpO2 97%   BMI 33.00 kg/m²     Physical Exam  Constitutional:       Appearance: Normal appearance.   HENT:      Head: Normocephalic and atraumatic.      Nose: Nose normal.      Mouth/Throat:      Mouth: Mucous membranes are moist.   Eyes:      Extraocular Movements: Extraocular movements intact.      Pupils: Pupils are equal, round, and reactive to light.   Cardiovascular:      Rate and Rhythm: Normal rate and regular rhythm.   Pulmonary:      Effort: No respiratory distress.      Breath sounds: No wheezing.   Abdominal:      General: Bowel sounds are normal.      Palpations: Abdomen is soft.   Neurological:      General: No focal deficit present.         Assessment/Plan   Problem List Items Addressed This Visit             ICD-10-CM    Healthcare maintenance - Primary Z00.00    Hyperlipidemia, mixed E78.2     Other Visit Diagnoses         Codes    Hypothyroidism, unspecified type     E03.9    Relevant Medications    levothyroxine (Synthroid, Levoxyl) 112 mcg tablet    Colon cancer screening     Z12.11    Relevant Orders    " Colonoscopy Screening; Average Risk Patient          He will continue to try to increase his exercising.  I will put a referral in for dermatology screening and he would like his colonoscopy, which is due next year, to be scheduled.  We reviewed his lipid panel from earlier and while it is still not ideal, he would prefer to continue to try to bring this down with diet and exercise.

## 2024-04-29 ENCOUNTER — TREATMENT (OUTPATIENT)
Dept: PHYSICAL THERAPY | Facility: CLINIC | Age: 54
End: 2024-04-29
Payer: MEDICAID

## 2024-04-29 DIAGNOSIS — M25.561 PAIN OF RIGHT KNEE AND LOWER LEG: ICD-10-CM

## 2024-04-29 DIAGNOSIS — M54.16 LUMBAR RADICULOPATHY: ICD-10-CM

## 2024-04-29 DIAGNOSIS — M79.661 PAIN OF RIGHT KNEE AND LOWER LEG: ICD-10-CM

## 2024-04-29 DIAGNOSIS — R26.2 DIFFICULTY IN WALKING INVOLVING LOWER LEG JOINT: Primary | ICD-10-CM

## 2024-04-29 PROCEDURE — 97110 THERAPEUTIC EXERCISES: CPT | Mod: GP | Performed by: PHYSICAL THERAPIST

## 2024-04-29 NOTE — PROGRESS NOTES
Physical Therapy    Physical Therapy Evaluation and Treatment      Patient Name: Glenn Kerns  MRN: [id number]  Today's Date: 4/29/24  Visit 4  Time Calculation  Start Time: 0920  Stop Time: 1000  Time Calculation (min): 40 min       PT Therapeutic Procedures Time Entry  Therapeutic Exercise Time Entry: 40       Assessment:  Glenn Kerns has less pain than usual and we increased exercise intensity a bit to get a greater training response    Plan:   Continue modify exercise program to minimize pain and improve general durability considering back and knee issues.    Current Problem:   1. Difficulty in walking involving lower leg joint        2. Pain of right knee and lower leg        3. Lumbar radiculopathy  Follow Up In Physical Therapy          Subjective    Glenn Kerns notes that his R knee is getting better , less frequent severe pain. He is starting to feel an increase sense of total body He has a new knee massage and infrared device. R knee pain 3/10. Overall his L LE radicular symptom is less severe and less frequent  Objective       Treatments:  There Ex:  Standing anti-rotation with blue t tube  2 x 20 sec  Standing KB front, side, and back hold 15 sec each 15 lbs x3   Cable retrowalk x10 6 plates  Tball rollout 2 x10  Partial range PB 2x 12 with adductor squeeze  Nustep 8 min at 60 RPM lv 2  Step taps  2x 10    EDUCATION:         Goals:    1. Decrease pain to 0-3/10 severity level   2. Increase   R Knee flexion AROM to   125 deg pain free  3. Increase knee extension strength to   4/5 MMT grade pain free  4. Improve ambulation ability to normalized on level surface and steps   5. Improve out come score by 15 points  6. independent Home exercise program

## 2024-05-03 NOTE — PROGRESS NOTES
"Physical Therapy    Physical Therapy Evaluation and Treatment      Patient Name: Glenn Kerns  MRN: [id number]  Today's Date: 5/6/24  Visit 5                  Assessment:  Glenn Kerns has less pain than usual and we increased exercise intensity a bit to get a greater training response    Plan:   Continue modify exercise program to minimize pain and improve general durability considering back and knee issues.    Current Problem:   1. Difficulty in walking involving lower leg joint        2. Pain of right knee and lower leg        3. Lumbar radiculopathy            Subjective    Glenn Kerns notes that he is continuing to feel better in back and R knee more often. He still presents with uncertainty about exercise frequency and intensity and \" what not to do\"  Pain   3-4/10 R medial knee   Objective       Treatments:  There Ex:  Hip hinge to wall 3x5  Standing anti-rotation with blue t tube  2 x 20 sec  Standing KB front, side, and back hold 15 sec each 15 lbs x3   Cable retrowalk x10 6 plates  Tball rollout 2 x10  Partial range PB 2x 12 with adductor squeeze  Nustep 8 min at 60 RPM lv 2  Step taps  2x 10    EDUCATION:         Goals:    1. Decrease pain to 0-3/10 severity level   2. Increase   R Knee flexion AROM to   125 deg pain free  3. Increase knee extension strength to   4/5 MMT grade pain free  4. Improve ambulation ability to normalized on level surface and steps   5. Improve out come score by 15 points  6. independent Home exercise program             "

## 2024-05-06 ENCOUNTER — TREATMENT (OUTPATIENT)
Dept: PHYSICAL THERAPY | Facility: CLINIC | Age: 54
End: 2024-05-06
Payer: MEDICAID

## 2024-05-06 DIAGNOSIS — M25.561 PAIN OF RIGHT KNEE AND LOWER LEG: ICD-10-CM

## 2024-05-06 DIAGNOSIS — R26.2 DIFFICULTY IN WALKING INVOLVING LOWER LEG JOINT: Primary | ICD-10-CM

## 2024-05-06 DIAGNOSIS — M54.16 LUMBAR RADICULOPATHY: ICD-10-CM

## 2024-05-06 DIAGNOSIS — M79.661 PAIN OF RIGHT KNEE AND LOWER LEG: ICD-10-CM

## 2024-05-06 PROCEDURE — 97110 THERAPEUTIC EXERCISES: CPT | Mod: GP | Performed by: PHYSICAL THERAPIST

## 2024-05-10 NOTE — PROGRESS NOTES
Physical Therapy    Physical Therapy Evaluation and Treatment      Patient Name: Glenn Kerns  MRN: [id number]  Today's Date: 5/13/24  Visit 6  Time Calculation  Start Time: 1015  Stop Time: 1110  Time Calculation (min): 55 min       PT Therapeutic Procedures Time Entry  Manual Therapy Time Entry: 25  Therapeutic Exercise Time Entry: 30       Assessment:  Glenn Kerns is able to be more active and have less back and L knee knee pain most of the time. He continues to have to manage pain through exercise and activity modification    Plan:   Continue modify exercise program to minimize pain and improve general durability considering back and knee issues.    Current Problem:   1. Pain of right knee and lower leg  Follow Up In Physical Therapy      2. Difficulty in walking involving lower leg joint  Follow Up In Physical Therapy          Subjective    Glenn Kerns notes that he is continuing to feel better in back and R knee more often. His R gr toe numbness is almost gone. He had some L posterior lateral hip post last visit. Walking more normal today . 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 Decrease pain to 0-3/10 severity level   2. Increase   R Knee flexion AROM to   125 deg pain free  3. Increase knee extension strength to   4/5 MMT grade pain free  4. Improve ambulation ability to normalized on level surface and steps   5.  Improve out come score by 15 points  6. independent Home exercise program

## 2024-05-13 ENCOUNTER — TREATMENT (OUTPATIENT)
Dept: PHYSICAL THERAPY | Facility: CLINIC | Age: 54
End: 2024-05-13
Payer: MEDICAID

## 2024-05-13 DIAGNOSIS — M79.661 PAIN OF RIGHT KNEE AND LOWER LEG: Primary | ICD-10-CM

## 2024-05-13 DIAGNOSIS — M25.561 PAIN OF RIGHT KNEE AND LOWER LEG: Primary | ICD-10-CM

## 2024-05-13 DIAGNOSIS — R26.2 DIFFICULTY IN WALKING INVOLVING LOWER LEG JOINT: ICD-10-CM

## 2024-05-13 PROCEDURE — 97110 THERAPEUTIC EXERCISES: CPT | Mod: GP | Performed by: PHYSICAL THERAPIST

## 2024-05-13 PROCEDURE — 97140 MANUAL THERAPY 1/> REGIONS: CPT | Mod: GP | Performed by: PHYSICAL THERAPIST

## 2024-05-17 NOTE — PROGRESS NOTES
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 Difficulty in walking involving lower leg joint  Follow Up In Physical Therapy      2. Pain of right knee and lower leg  Follow Up In Physical Therapy      3. Lumbar radiculopathy            Subjective    Glenn Kerns notes that he has had L hand distal numbness since performing lumbar roll positioning in supine. Also has medial knee pain aggravated by prolonged standing. 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 Decrease pain to 0-3/10 severity level   2. Increase   R Knee flexion AROM to   125 deg pain free  3. Increase knee extension strength to   4/5 MMT grade pain free  4. Improve ambulation ability to normalized on level surface and steps   5. Improve out come score by 15 points  6. independent Home exercise program

## 2024-05-20 ENCOUNTER — TREATMENT (OUTPATIENT)
Dept: PHYSICAL THERAPY | Facility: CLINIC | Age: 54
End: 2024-05-20
Payer: MEDICAID

## 2024-05-20 DIAGNOSIS — M79.661 PAIN OF RIGHT KNEE AND LOWER LEG: ICD-10-CM

## 2024-05-20 DIAGNOSIS — M54.16 LUMBAR RADICULOPATHY: ICD-10-CM

## 2024-05-20 DIAGNOSIS — R26.2 DIFFICULTY IN WALKING INVOLVING LOWER LEG JOINT: Primary | ICD-10-CM

## 2024-05-20 DIAGNOSIS — M25.561 PAIN OF RIGHT KNEE AND LOWER LEG: ICD-10-CM

## 2024-05-20 PROCEDURE — 97010 HOT OR COLD PACKS THERAPY: CPT | Mod: GP | Performed by: PHYSICAL THERAPIST

## 2024-05-20 PROCEDURE — 97140 MANUAL THERAPY 1/> REGIONS: CPT | Mod: GP | Performed by: PHYSICAL THERAPIST

## 2024-06-14 NOTE — PROGRESS NOTES
Physical Therapy    Physical Therapy Evaluation and Treatment      Patient Name: Glenn Kerns  MRN: [id number]  Today's Date: 6/17/24  Visit 8  Time Calculation  Start Time: 1100  Stop Time: 1145  Time Calculation (min): 45 min       PT Therapeutic Procedures Time Entry  Neuromuscular Re-Education Time Entry: 30    PT Modalities Time Entry  Hot/Cold Pack Time Entry: 10  Assessment:  Glenn Kerns has continue total body irritability even with therapeutic maneuvers and exercise. We again backed off exercise and treated with intention relaxation technique to reduce pain and increase spinal mobility.  Plan:   Monitor pain and treat accordingly    Current Problem:   1. Difficulty in walking involving lower leg joint  Follow Up In Physical Therapy      2. Pain of right knee and lower leg  Follow Up In Physical Therapy      3. Lumbar radiculopathy            Subjective    Glenn Kerns had negative reaction secondary to lying supine on lumbar lordosis pillow in upper back and neck and distal UE symptom. It helped lumbar and sciatic symptom. His back and R knee were getting better but since he has had the neck pain/UE tingling he has eliminated the lumbar device  Pain   7/10 R medial knee and L hand radicular symptom  Objective       Treatments:   Modality :  HP to neck and back in supine with flexion stool 15 min before manual therapy  Manual therapy:  Intention relaxation technique top to bottom including L arm pull in plane of scapula  There Ex:  Diaphragmatic breathing 2 x 10      EDUCATION:        Goals:    1. Decrease pain to 0-3/10 severity level   2. Increase   R Knee flexion AROM to   125 deg pain free  3. Increase knee extension strength to   4/5 MMT grade pain free  4. Improve ambulation ability to normalized on level surface and steps   5. Improve out come score by 15 points  6. independent Home exercise program

## 2024-06-17 ENCOUNTER — TREATMENT (OUTPATIENT)
Dept: PHYSICAL THERAPY | Facility: CLINIC | Age: 54
End: 2024-06-17
Payer: MEDICAID

## 2024-06-17 DIAGNOSIS — R26.2 DIFFICULTY IN WALKING INVOLVING LOWER LEG JOINT: Primary | ICD-10-CM

## 2024-06-17 DIAGNOSIS — M79.661 PAIN OF RIGHT KNEE AND LOWER LEG: ICD-10-CM

## 2024-06-17 DIAGNOSIS — M54.16 LUMBAR RADICULOPATHY: ICD-10-CM

## 2024-06-17 DIAGNOSIS — M25.561 PAIN OF RIGHT KNEE AND LOWER LEG: ICD-10-CM

## 2024-06-17 PROCEDURE — 97010 HOT OR COLD PACKS THERAPY: CPT | Mod: GP | Performed by: PHYSICAL THERAPIST

## 2024-06-17 PROCEDURE — 97112 NEUROMUSCULAR REEDUCATION: CPT | Mod: GP | Performed by: PHYSICAL THERAPIST

## 2024-06-24 NOTE — PROGRESS NOTES
Physical Therapy    Physical Therapy Evaluation and Treatment      Patient Name: Glenn Kerns  MRN: [id number]  Today's Date: 6/25/24  Visit 9  Time Calculation  Start Time: 1100  Stop Time: 1150  Time Calculation (min): 50 min       PT Therapeutic Procedures Time Entry  Manual Therapy Time Entry: 25  Therapeutic Exercise Time Entry: 15    PT Modalities Time Entry  Hot/Cold Pack Time Entry: 10  Assessment:  Glenn Kerns has chronic multi-location pain with bilat UE and LE radicular symptom. He continues having difficulty handling the fluctuating pain severity and location. He has a new script for neck PT. We are trying to help him modulate his pain via the intension relaxtion technique and gradually re-instituting fundamental movement patterns.  Plan:      Current Problem:   1. Difficulty in walking involving lower leg joint  Follow Up In Physical Therapy      2. Pain of right knee and lower leg  Follow Up In Physical Therapy      3. Lumbar radiculopathy            Subjective    Glenn Kerns has persistent multi-location pain and paresthesia  Pain   7/10 R medial knee and L hand radicular symptom. C/o tingling fatigue of upper  back and neck  Objective       Treatments:   Modality :  HP to neck and back in supine   Manual therapy:  Intention relaxation technique top to bottom including L arm pull in plane of scapula  There Ex:  Diaphragmatic breathing 2 x 10  Recumbent stepper 10 min for recipricol UE and LE after manual treatment      EDUCATION:        Goals:    1. Decrease pain to 0-3/10 severity level   2. Increase   R Knee flexion AROM to   125 deg pain free  3. Increase knee extension strength to   4/5 MMT grade pain free  4. Improve ambulation ability to normalized on level surface and steps   5. Improve out come score by 15 points  6. independent Home exercise program

## 2024-06-25 ENCOUNTER — TREATMENT (OUTPATIENT)
Dept: PHYSICAL THERAPY | Facility: CLINIC | Age: 54
End: 2024-06-25
Payer: MEDICAID

## 2024-06-25 DIAGNOSIS — R26.2 DIFFICULTY IN WALKING INVOLVING LOWER LEG JOINT: Primary | ICD-10-CM

## 2024-06-25 DIAGNOSIS — M54.16 LUMBAR RADICULOPATHY: ICD-10-CM

## 2024-06-25 DIAGNOSIS — M79.661 PAIN OF RIGHT KNEE AND LOWER LEG: ICD-10-CM

## 2024-06-25 DIAGNOSIS — M25.561 PAIN OF RIGHT KNEE AND LOWER LEG: ICD-10-CM

## 2024-06-25 PROCEDURE — 97110 THERAPEUTIC EXERCISES: CPT | Mod: GP | Performed by: PHYSICAL THERAPIST

## 2024-06-25 PROCEDURE — 97140 MANUAL THERAPY 1/> REGIONS: CPT | Mod: GP | Performed by: PHYSICAL THERAPIST

## 2024-06-25 PROCEDURE — 97010 HOT OR COLD PACKS THERAPY: CPT | Mod: GP | Performed by: PHYSICAL THERAPIST

## 2024-06-28 NOTE — PROGRESS NOTES
Physical Therapy    Physical Therapy Evaluation and Treatment      Patient Name: Glenn Kerns  MRN: [id number]  Today's Date: 24  Visit 9                  Physical Therapy    Discharge Summary    Name: Glenn Kerns  MRN: [id number]  : 1970  Date: 24    Discharge Summary: PT    Discharge Information: Date of discharge 24 and Date of last visit     Therapy Summary: Glenn Kerns has chronic multi-anatomic region pain. We are discharging knee and back chart as he has a new referral for evaluation of neck.    Discharge Status: see assessment lat visit 24 for knee and back status     Rehab Discharge Reason: Progress plateaued; further improvement possible

## 2024-07-01 ENCOUNTER — TREATMENT (OUTPATIENT)
Dept: PHYSICAL THERAPY | Facility: CLINIC | Age: 54
End: 2024-07-01
Payer: MEDICAID

## 2024-07-01 DIAGNOSIS — R26.2 DIFFICULTY IN WALKING INVOLVING LOWER LEG JOINT: ICD-10-CM

## 2024-07-01 DIAGNOSIS — M54.12 CERVICAL RADICULOPATHY: Primary | ICD-10-CM

## 2024-07-01 PROCEDURE — 97162 PT EVAL MOD COMPLEX 30 MIN: CPT | Mod: GP | Performed by: PHYSICAL THERAPIST

## 2024-07-01 PROCEDURE — 97112 NEUROMUSCULAR REEDUCATION: CPT | Mod: GP | Performed by: PHYSICAL THERAPIST

## 2024-07-01 NOTE — PROGRESS NOTES
Physical Therapy    Physical Therapy Evaluation and Treatment      Patient Name: Glenn Kerns  MRN: [id number]  Today's Date: 7/1/2024  Visit 1   Time Calculation  Start Time: 1105  Stop Time: 1200  Time Calculation (min): 55 min    Assessment:    Patient presents with clinical signs and symptoms consistent with cervical radiculopathy with bilat  UE radicular symptom in the  C 6,7, 8  distribution. These impairments affect ADLs, work, recreational activity, exercise, transfer ability,  and sleep function that requires skilled PT intervention to resolve and enable patient to return to previous level of function. Factors that may affect progress in PT are chronic pain, obesity, medical co-morbidities, ,depression, and patient compliance.      Plan:  OP PT Plan  Treatment/Interventions: Education/ Instruction, Hot pack, Manual therapy, Mechanical traction, Neuromuscular re-education, Self care/ home management, Therapeutic activities, Therapeutic exercises  PT Plan: Skilled PT  PT Frequency: 1 time per week  Duration: 12  Onset Date: 01/01/20  Certification Period Start Date: 07/01/24  Certification Period End Date: 09/29/24  Rehab Potential: Fair  Plan of Care Agreement: Patient    Current Problem:   1. Cervical radiculopathy  Follow Up In Physical Therapy      2. Difficulty in walking involving lower leg joint  Follow Up In Physical Therapy          Subjective    Glenn Kerns is well known to us after being treated at this PT clinic for knee and LBP. He has a h/o cervical micro discectomy C6-7 decompression surgery. He has carpal tunnel syndrome bilat. He c/o stiff neck more than pain today. He has multi-joint and body pain. His exercised tolerance is low as we have seen throughout his back and knee PT episodes. He treats himself with a vibration machine that helps his arm symptoms     Pain:   Neck pain range  Bilat UE distal C6-8 paresthesias  Home Living:   Lives with elderly father  Prior Level of Function:  Prior  Function Per Pt/Caregiver Report  Hand Dominance: Rightindependent ADLs.  H/o chronic pain    Objective   Cognition:   A+Ox3  Observation:    Shoulder joint clearance:   Shoulder flexion  R  160 deg   L   160 deg  Cervical AROM : Flex  80 %  bilat distal  whole hand  , Extension   50 % bilat distal whole hand ,   Rotation  R  60 deg  L 60  deg bilat whole hand  Peripheralizes       Centralizes    Myotomal Strength:  C4   ,  C5  , C6  ,  C7     C8     T1    all clear    Shoulder Strength:  3+-4/5 all       Sensation: paresthesia in C6-7-8 distribution  Tinel sign  Carpal tunnel  neg bilat  , ulnar notch + bilat     Segmental cervical  joint mobility: Glenn Kerns is guarded agains manual cervical movement anywhere near end range    Palpation:  Generalize bilat UQ tenderness all anatomical locations                      Special Tests:    ,  Vertical compression  + -,  Distraction reduces distal symptom severity slightly                           Swallow    -     Outcome Measures:  NDI: 34%    Treatments:  Neuro re-ed:  Intention relaxation technique cranial to distal gentle grade 1 oscillations    EDUCATION:   extensive education regarding mechanism of injury, relevant functional anatomy, treatment program rational, self management, HEP, and POC      Goals:  1. Reduce intensity and frequency of bilat UE radicular symptom  2. Improve NDI by 5 % points  3. independent Home exercise program

## 2024-07-05 NOTE — PROGRESS NOTES
Physical Therapy    Physical Therapy Evaluation and Treatment      Patient Name: Glenn Kerns  MRN: [id number]  Today's Date: 7/5/2024  Visit 1        Assessment:    Patient presents with clinical signs and symptoms consistent with cervical radiculopathy with bilat  UE radicular symptom in the  C 6,7, 8  distribution. These impairments affect ADLs, work, recreational activity, exercise, transfer ability,  and sleep function that requires skilled PT intervention to resolve and enable patient to return to previous level of function. Factors that may affect progress in PT are chronic pain, obesity, medical co-morbidities, ,depression, and patient compliance.      Plan:       Current Problem:   1. Difficulty in walking involving lower leg joint        2. Cervical radiculopathy            Subjective    Glenn Kerns is well known to us after being treated at this PT clinic for knee and LBP. He has a h/o cervical micro discectomy C6-7 decompression surgery. He has carpal tunnel syndrome bilat. He c/o stiff neck more than pain today. He has multi-joint and body pain. His exercised tolerance is low as we have seen throughout his back and knee PT episodes. He treats himself with a vibration machine that helps his arm symptoms     Pain:   Neck pain range  Bilat UE distal C6-8 paresthesias  Home Living:   Lives with elderly father  Prior Level of Function:   independent ADLs.  H/o chronic pain    Objective   Cognition:   A+Ox3  Observation:    Shoulder joint clearance:   Shoulder flexion  R  160 deg   L   160 deg  Cervical AROM : Flex  80 %  bilat distal  whole hand  , Extension   50 % bilat distal whole hand ,   Rotation  R  60 deg  L 60  deg bilat whole hand  Peripheralizes       Centralizes    Myotomal Strength:  C4   ,  C5  , C6  ,  C7     C8     T1    all clear    Shoulder Strength:  3+-4/5 all       Sensation: paresthesia in C6-7-8 distribution  Tinel sign  Carpal tunnel  neg bilat  , ulnar notch + bilat     Segmental  cervical  joint mobility: Glenn Kerns is guarded agains manual cervical movement anywhere near end range    Palpation:  Generalize bilat UQ tenderness all anatomical locations                      Special Tests:    ,  Vertical compression  + -,  Distraction reduces distal symptom severity slightly                           Swallow    -     Outcome Measures:  NDI: 34%    Treatments:  Neuro re-ed:  Intention relaxation technique cranial to distal gentle grade 1 oscillations    EDUCATION:   extensive education regarding mechanism of injury, relevant functional anatomy, treatment program rational, self management, HEP, and POC      Goals:  1. Reduce intensity and frequency of bilat UE radicular symptom  2. Improve NDI by 5 % points  3. independent Home exercise program

## 2024-07-08 ENCOUNTER — APPOINTMENT (OUTPATIENT)
Dept: PHYSICAL THERAPY | Facility: CLINIC | Age: 54
End: 2024-07-08
Payer: MEDICAID

## 2024-07-08 DIAGNOSIS — R26.2 DIFFICULTY IN WALKING INVOLVING LOWER LEG JOINT: Primary | ICD-10-CM

## 2024-07-08 DIAGNOSIS — M54.12 CERVICAL RADICULOPATHY: ICD-10-CM

## 2024-08-01 NOTE — PROGRESS NOTES
Physical Therapy    Physical Therapy Evaluation and Treatment      Patient Name: Glenn Kerns  MRN: [id number]  Today's Date: 8/2/24  Visit  2  Time Calculation  Start Time: 0905  Stop Time: 1000  Time Calculation (min): 55 min    Assessment:  Glenn Kerns has good relaxation response to intention relaxation technique pain 2/10. Her was able to poerform general recipicol movement on stepper after neuro re-ed technique without aggravating pain      Plan:   Try to progress general movement for pain management     Current Problem:   1. 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 He treated the R knee pain with icing and it is better. He had a fall onto L shoulder on  7/21. Currently L shoulder pain and L UE distal C6 -8 paresthsia. 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 Reduce intensity and frequency of bilat UE radicular symptom  2. Improve NDI by 5 % points  3. independent Home exercise program

## 2024-08-02 ENCOUNTER — TREATMENT (OUTPATIENT)
Dept: PHYSICAL THERAPY | Facility: CLINIC | Age: 54
End: 2024-08-02
Payer: MEDICAID

## 2024-08-02 DIAGNOSIS — M54.12 CERVICAL RADICULOPATHY: Primary | ICD-10-CM

## 2024-08-02 PROCEDURE — 97010 HOT OR COLD PACKS THERAPY: CPT | Mod: GP | Performed by: PHYSICAL THERAPIST

## 2024-08-02 PROCEDURE — 97112 NEUROMUSCULAR REEDUCATION: CPT | Mod: GP | Performed by: PHYSICAL THERAPIST

## 2024-08-02 PROCEDURE — 97110 THERAPEUTIC EXERCISES: CPT | Mod: GP | Performed by: PHYSICAL THERAPIST

## 2024-08-08 NOTE — PROGRESS NOTES
Physical Therapy    Physical Therapy Evaluation and Treatment      Patient Name: Glenn Kerns  MRN: [id number]  Today's Date: 8/9/24  Visit  3  Time Calculation  Start Time: 0900  Stop Time: 1000  Time Calculation (min): 60 min    Assessment:  Glenn Kerns c/o variable multi-location pain and stiffness. He feels that he gets pain relief from recent PT treatments that last a day. He was able to move more easily andless painfully post treatment today so we were able resume an active exercise program.    Plan:   Try to progress general movement for pain management     Current Problem:   1. 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 R knee feels better but still nmedial knee pain  Pain:   0/10 Neck pain range  R knee 3/10      Objective   L wrist stiff into flexion  Treatments:  HP to neck and lumbar before intension technique  Neuro re-ed:  Intention relaxation technique cranial to distal gentle grade 1 oscillations  There ex : sci fit recumbent stepper lv 2 8 min at 50RPM                   Standing tband row red 2x20                   Standing anti-rotation red 2x 20 sec  EDUCATION:      Advised Glenn Kerns to resume low load high rep tband exercises previously instructed   Goals:  1. Reduce intensity and frequency of bilat UE radicular symptom  2. Improve NDI by 5 % points  3. independent Home exercise program

## 2024-08-09 ENCOUNTER — TREATMENT (OUTPATIENT)
Dept: PHYSICAL THERAPY | Facility: CLINIC | Age: 54
End: 2024-08-09
Payer: MEDICAID

## 2024-08-09 DIAGNOSIS — M54.12 CERVICAL RADICULOPATHY: Primary | ICD-10-CM

## 2024-08-09 PROCEDURE — 97010 HOT OR COLD PACKS THERAPY: CPT | Mod: GP | Performed by: PHYSICAL THERAPIST

## 2024-08-09 PROCEDURE — 97112 NEUROMUSCULAR REEDUCATION: CPT | Mod: GP | Performed by: PHYSICAL THERAPIST

## 2024-08-09 PROCEDURE — 97110 THERAPEUTIC EXERCISES: CPT | Mod: GP | Performed by: PHYSICAL THERAPIST

## 2024-08-16 ENCOUNTER — APPOINTMENT (OUTPATIENT)
Dept: PHYSICAL THERAPY | Facility: CLINIC | Age: 54
End: 2024-08-16
Payer: MEDICAID

## 2024-08-22 ENCOUNTER — APPOINTMENT (OUTPATIENT)
Dept: DERMATOLOGY | Facility: CLINIC | Age: 54
End: 2024-08-22
Payer: MEDICAID

## 2024-08-22 DIAGNOSIS — D22.5 MELANOCYTIC NEVUS OF TRUNK: ICD-10-CM

## 2024-08-22 DIAGNOSIS — D48.5 NEOPLASM OF UNCERTAIN BEHAVIOR OF SKIN: Primary | ICD-10-CM

## 2024-08-22 DIAGNOSIS — L57.8 DIFFUSE PHOTODAMAGE OF SKIN: ICD-10-CM

## 2024-08-22 DIAGNOSIS — L73.9 FOLLICULITIS: ICD-10-CM

## 2024-08-22 DIAGNOSIS — L21.9 SEBORRHEIC DERMATITIS: ICD-10-CM

## 2024-08-22 DIAGNOSIS — D18.01 HEMANGIOMA OF SKIN: ICD-10-CM

## 2024-08-22 PROCEDURE — 99204 OFFICE O/P NEW MOD 45 MIN: CPT | Performed by: DERMATOLOGY

## 2024-08-22 PROCEDURE — 11301 SHAVE SKIN LESION 0.6-1.0 CM: CPT | Performed by: DERMATOLOGY

## 2024-08-22 RX ORDER — CLINDAMYCIN PHOSPHATE 10 UG/ML
LOTION TOPICAL 2 TIMES DAILY
Qty: 60 ML | Refills: 11 | Status: SHIPPED | OUTPATIENT
Start: 2024-08-22 | End: 2025-08-22

## 2024-08-22 RX ORDER — KETOCONAZOLE 20 MG/ML
SHAMPOO, SUSPENSION TOPICAL
Qty: 120 ML | Refills: 11 | Status: SHIPPED | OUTPATIENT
Start: 2024-08-22

## 2024-08-22 ASSESSMENT — DERMATOLOGY PATIENT ASSESSMENT
DO YOU USE A TANNING BED: NO
DO YOU HAVE ANY NEW OR CHANGING LESIONS: NO

## 2024-08-22 ASSESSMENT — DERMATOLOGY QUALITY OF LIFE (QOL) ASSESSMENT: ARE THERE EXCLUSIONS OR EXCEPTIONS FOR THE QUALITY OF LIFE ASSESSMENT: NO

## 2024-08-22 NOTE — PROGRESS NOTES
Subjective     Glenn Kerns is a 54 y.o. male who presents for the following: Skin Exam.  He states he has not noticed any changes in any of his brown spots recently, including in size, shape, or color, and they are all asymptomatic with no associated bleeding, itching, or pain.  He also notes a dry, flaky scalp and intermittent pimple breakouts on his scalp, mainly the back of his scalp.  He denies any other new, changing, or concerning skin lesions; no bleeding, itching, or burning lesions.      Review of Systems:  No other skin or systemic complaints other than what is documented elsewhere in the note.    The following portions of the chart were reviewed this encounter and updated as appropriate:       Skin Cancer History  No skin cancer on file.    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[employer], rock, and blues, and now teaches music online    Allergies:  Lactase    Current Medications / CAM's:    Current Outpatient Medications:     clindamycin (Cleocin T) 1 % lotion, Apply topically 2 times a day., Disp: 60 mL, Rfl: 11    clotrimazole (Lotrimin) 1 % external solution, Apply topically 2 times a day., Disp: , Rfl:     clotrimazole-betamethasone (Lotrisone) cream, Apply 1 Application topically 2 times a day., Disp: , Rfl:     hydrocortisone (Anusol-HC) 2.5 % rectal cream, Insert into the rectum 2 times a day., Disp: 30 g, Rfl: 3    hydrocortisone (Anusol-HC) 25 mg suppository, Insert 1 suppository (25 mg) into the rectum once daily., Disp: 24 suppository, Rfl: 1    ketoconazole (NIZOral) 2 % cream, Apply topically 2 times a day., Disp: , Rfl:     ketoconazole (NIZOral) 2 % shampoo, Wash affected  areas of scalp 2-3 times weekly as directed, Disp: 120 mL, Rfl: 11    levothyroxine (Synthroid, Levoxyl) 112 mcg tablet, Take 1 tablet (112 mcg) by mouth once daily in the morning. Take before meals., Disp: 90 tablet, Rfl: 3     Objective   Well appearing patient in no apparent distress; mood and affect are within normal limits.    A full examination was performed including scalp, face, eyes, ears, nose, lips, neck, chest, axillae, abdomen, back, bilateral upper extremities, and bilateral lower extremities. All findings within normal limits unless otherwise noted below.    Assessment/Plan   1. Neoplasm of uncertain behavior of skin (2)  Left Upper Back  4 mm dark brown pigmented, asymmetric macule with an asymmetric pigment network and irregular borders           Shave removal    Lesion length (cm):  0.4  Margin per side (cm):  0.2  Lesion diameter (cm):  0.8  Informed consent: discussed and consent obtained    Timeout: patient name, date of birth, surgical site, and procedure verified    Procedure prep:  Patient was prepped and draped  Anesthesia: the lesion was anesthetized in a standard fashion    Anesthetic:  1% lidocaine w/ epinephrine 1-100,000 local infiltration  Instrument used: flexible razor blade    Hemostasis achieved with: aluminum chloride    Outcome: patient tolerated procedure well    Post-procedure details: sterile dressing applied and wound care instructions given    Dressing type: bandage and petrolatum      Staff Communication: Dermatology Local Anesthesia: 1 % Lidocaine / Epinephrine - Amount:0.5ml    Specimen 1 - Dermatopathology- DERM LAB  Differential Diagnosis: DN  Check Margins Yes/No?:    Comments:    Dermpath Lab: Routine Histopathology (formalin-fixed tissue)    Left Proximal Shoulder  7 mm pink, shiny papule           Shave removal    Lesion length (cm):  0.7  Margin per side (cm):  0  Lesion diameter (cm):  0.7  Informed consent: discussed and consent obtained    Timeout: patient name,  date of birth, surgical site, and procedure verified    Procedure prep:  Patient was prepped and draped  Anesthesia: the lesion was anesthetized in a standard fashion    Anesthetic:  1% lidocaine w/ epinephrine 1-100,000 local infiltration  Instrument used: flexible razor blade    Hemostasis achieved with: aluminum chloride    Outcome: patient tolerated procedure well    Post-procedure details: sterile dressing applied and wound care instructions given    Dressing type: bandage and petrolatum      Staff Communication: Dermatology Local Anesthesia: 1 % Lidocaine / Epinephrine - Amount:0.5ml    Specimen 2 - Dermatopathology- DERM LAB  Differential Diagnosis: ISK v BCC v BLK v SCCIS  Check Margins Yes/No?:    Comments:    Dermpath Lab: Routine Histopathology (formalin-fixed tissue)    2. Melanocytic nevus of trunk  Scattered on the patient's face, neck, trunk, and extremities, there are several small, round- to oval-shaped, brown-pigmented and pink-colored, symmetric, uniform-appearing macules and dome-shaped papules    Clinically benign- to slightly atypical-appearing nevi - the clinically benign- to slightly atypical-appearing nature of the remainder of the patient's nevi was discussed with the patient today.  None of the patient's nevi, with the exception of the one noted above, meet threshold for biopsy today.  I emphasized the importance of performing monthly self-skin exams using the ABCDs of monitoring moles, which were reviewed with the patient today and an informational hand-out provided.  I also emphasized the importance of sun avoidance and sun protection with daily sunscreen use.    3. Seborrheic dermatitis  Scalp  On the patient's scalp, there are pink, scaly patches with whitish-yellowish, greasy scale    Seborrheic Dermatitis - scalp.  The potentially chronic and intermittently flaring nature of this condition and treatment options were discussed extensively with the patient today.  At this time, I  recommend topical anti-fungal therapy with Ketoconazole 2% shampoo, which the patient was instructed to use 2-3 days per week, alternating with over-the-counter anti-dandruff shampoos, such as Head & Shoulders, Selsun Blue, and Neutrogena T-gel, every month.  The risks, benefits, and side effects of this medication were discussed.  The patient expressed understanding and is in agreement with this plan.    ketoconazole (NIZOral) 2 % shampoo - Scalp  Wash affected areas of scalp 2-3 times weekly as directed    4. Folliculitis  Scalp  Scattered on the patient's scalp, there are several follicular-based erythematous, inflammatory papules and pustules    Folliculitis - scalp.  The bacterial nature of this condition and treatment options were discussed with the patient today.  At this time, I recommend topical antibiotic therapy with Clindamycin 1% lotion, which the patient was instructed to apply twice daily to the affected areas or up to 3-4 times per day as needed for active lesions.  The risks, benefits, and side effects of this medication were discussed.  The patient expressed understanding and is in agreement with this plan.    clindamycin (Cleocin T) 1 % lotion - Scalp  Apply topically 2 times a day.    5. Hemangioma of skin  Scattered on the patient's face, neck, trunk, and extremities, there are multiple small, round, cherry red- to purplish-colored, symmetric, uniform, vascular-appearing macules and papules    Cherry Angiomas - the benign nature of these vascular lesions was discussed with the patient today and reassurance provided.  No treatment is medically indicated for these lesions at this time.    6. Diffuse photodamage of skin  Photodistributed  Diffuse photodamage with actinic changes with telangiectasia and mottled pigmentation in sun-exposed areas.    Photodamage.  The signs and symptoms of skin cancer were reviewed and the patient was advised to practice sun protection and sun avoidance, use daily  sunscreen, and perform regular self skin exams.  Sun protection was discussed, including avoiding the mid-day sun, wearing a sunscreen with SPF at least 50, and stressing the need for reapplication of sunscreen and applying more than they think they need.

## 2024-08-23 ENCOUNTER — TREATMENT (OUTPATIENT)
Dept: PHYSICAL THERAPY | Facility: CLINIC | Age: 54
End: 2024-08-23
Payer: MEDICAID

## 2024-08-23 DIAGNOSIS — R26.2 DIFFICULTY IN WALKING INVOLVING LOWER LEG JOINT: Primary | ICD-10-CM

## 2024-08-23 DIAGNOSIS — M54.12 CERVICAL RADICULOPATHY: ICD-10-CM

## 2024-08-26 LAB
LABORATORY COMMENT REPORT: NORMAL
PATH REPORT.FINAL DX SPEC: NORMAL
PATH REPORT.GROSS SPEC: NORMAL
PATH REPORT.MICROSCOPIC SPEC OTHER STN: NORMAL
PATH REPORT.RELEVANT HX SPEC: NORMAL
PATH REPORT.TOTAL CANCER: NORMAL

## 2024-08-28 ENCOUNTER — TELEPHONE (OUTPATIENT)
Dept: DERMATOLOGY | Facility: CLINIC | Age: 54
End: 2024-08-28
Payer: MEDICAID

## 2024-08-28 NOTE — PROGRESS NOTES
Physical Therapy    Physical Therapy Evaluation and Treatment      Patient Name: Glenn Kerns  MRN: [id number]  Today's Date: 8/29/24  Visit  4  Time Calculation  Start Time: 0940  Stop Time: 1030  Time Calculation (min): 50 min    Assessment:  Glenn Kerns c/o variable multi-location pain and stiffness. He feels that he gets pain relief from recent PT treatments that last a day. We treated the back/leg symptom today to reduce distal leg symptom with prone Veronica approach. He was very stiff across lumbar spine post treatment secondary to prolonged prone positioning    Plan:   Try to progress general movement for pain management         Problem List Items Addressed This Visit             ICD-10-CM    Pain of right knee and lower leg M25.561, M79.661    Difficulty in walking involving lower leg joint - Primary R26.2    Cervical radiculopathy M54.12         Subjective    Glenn Kerns notes that he has greater LBP and distal R LE radicular symptom  Pain:   6/10 back pain and increased numbness R toe  , R knee is better      Objective     Treatments:  HP to neck and lumbar before intention technique  Neuro re-ed:  Intention relaxation technique cranial to distal gentle grade 1 oscillations and deep neck flexor activation 15 min  Manual   MFR bilat QL lumbopelvic /posterior lateral hips 15 min  There ex :   Prone positioning 3 min flat    Prone positioning 5 min     1 pillow extension static 8 min          Hard walk  4 min  EDUCATION:    Extensive discussion regarding pain management and need to progress to movement despite generalized pain.    Goals:  1. Reduce intensity and frequency of bilat UE radicular symptom  2. Improve NDI by 5 % points  3. independent Home exercise program

## 2024-08-28 NOTE — TELEPHONE ENCOUNTER
Pt left message that he has seen his results in my chart and does not understand if he needs treatment or not ? I returned call left  that Dr Conley will be calling soon

## 2024-08-29 ENCOUNTER — OFFICE VISIT (OUTPATIENT)
Dept: DERMATOLOGY | Facility: CLINIC | Age: 54
End: 2024-08-29
Payer: MEDICAID

## 2024-08-29 ENCOUNTER — APPOINTMENT (OUTPATIENT)
Dept: PHYSICAL THERAPY | Facility: CLINIC | Age: 54
End: 2024-08-29
Payer: MEDICAID

## 2024-08-29 DIAGNOSIS — D48.5 NEOPLASM OF UNCERTAIN BEHAVIOR OF SKIN: Primary | ICD-10-CM

## 2024-08-29 DIAGNOSIS — M79.661 PAIN OF RIGHT KNEE AND LOWER LEG: ICD-10-CM

## 2024-08-29 DIAGNOSIS — L57.8 DIFFUSE PHOTODAMAGE OF SKIN: ICD-10-CM

## 2024-08-29 DIAGNOSIS — M54.12 CERVICAL RADICULOPATHY: ICD-10-CM

## 2024-08-29 DIAGNOSIS — M25.561 PAIN OF RIGHT KNEE AND LOWER LEG: ICD-10-CM

## 2024-08-29 DIAGNOSIS — L82.0 LICHENOID KERATOSIS: ICD-10-CM

## 2024-08-29 DIAGNOSIS — R26.2 DIFFICULTY IN WALKING INVOLVING LOWER LEG JOINT: Primary | ICD-10-CM

## 2024-08-29 DIAGNOSIS — Z86.018 HISTORY OF DYSPLASTIC NEVUS: ICD-10-CM

## 2024-08-29 DIAGNOSIS — D22.4 MELANOCYTIC NEVUS OF NECK: ICD-10-CM

## 2024-08-29 DIAGNOSIS — L81.4 LENTIGO: ICD-10-CM

## 2024-08-29 PROCEDURE — 97110 THERAPEUTIC EXERCISES: CPT | Mod: GP | Performed by: PHYSICAL THERAPIST

## 2024-08-29 PROCEDURE — 11302 SHAVE SKIN LESION 1.1-2.0 CM: CPT | Performed by: DERMATOLOGY

## 2024-08-29 PROCEDURE — 97140 MANUAL THERAPY 1/> REGIONS: CPT | Mod: GP | Performed by: PHYSICAL THERAPIST

## 2024-08-29 PROCEDURE — 99213 OFFICE O/P EST LOW 20 MIN: CPT | Performed by: DERMATOLOGY

## 2024-08-29 PROCEDURE — 97112 NEUROMUSCULAR REEDUCATION: CPT | Mod: GP | Performed by: PHYSICAL THERAPIST

## 2024-08-30 ENCOUNTER — TELEPHONE (OUTPATIENT)
Dept: DERMATOLOGY | Facility: CLINIC | Age: 54
End: 2024-08-30
Payer: MEDICAID

## 2024-08-30 NOTE — PROGRESS NOTES
Subjective     Glenn Kerns is a 54 y.o. male who presents for the following: Discuss recent biopsy results and management options.  Biopsy of 2 suspicious lesions performed at his initial visit in our office on 8/22/2024 revealed a mildly dysplastic junctional nevus on his left upper back and a lichenoid keratosis on his left proximal shoulder.    Today, the patient presents to our office requesting to discuss his biopsy results in person.  He states the biopsy sites have healed well.  He denies any new, changing, or concerning skin lesions since his last visit; no bleeding, itching, or burning lesions.      Review of Systems:  No other skin or systemic complaints other than what is documented elsewhere in the note.    The following portions of the chart were reviewed this encounter and updated as appropriate:       Skin Cancer History  No skin cancer on file.    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[employer], rock, and blues, and now teaches music online    Allergies:  Lactase    Current Medications / CAM's:    Current Outpatient Medications:     clindamycin (Cleocin T) 1 % lotion, Apply topically 2 times a day., Disp: 60 mL, Rfl: 11    clotrimazole (Lotrimin) 1 % external solution, Apply topically 2 times a day., Disp: , Rfl:     clotrimazole-betamethasone (Lotrisone) cream, Apply 1 Application topically 2 times a day., Disp: , Rfl:     hydrocortisone (Anusol-HC) 2.5 % rectal cream, Insert into the rectum 2 times a day., Disp: 30 g, Rfl: 3    hydrocortisone (Anusol-HC) 25 mg  suppository, Insert 1 suppository (25 mg) into the rectum once daily., Disp: 24 suppository, Rfl: 1    ketoconazole (NIZOral) 2 % cream, Apply topically 2 times a day., Disp: , Rfl:     ketoconazole (NIZOral) 2 % shampoo, Wash affected areas of scalp 2-3 times weekly as directed, Disp: 120 mL, Rfl: 11    levothyroxine (Synthroid, Levoxyl) 112 mcg tablet, Take 1 tablet (112 mcg) by mouth once daily in the morning. Take before meals., Disp: 90 tablet, Rfl: 3     Objective   Well appearing patient in no apparent distress; mood and affect are within normal limits.    A waist-up examination was performed including scalp, face, eyes, ears, nose, lips, neck, chest, axillae, abdomen, back, and bilateral upper extremities. All findings within normal limits unless otherwise noted below.        Assessment/Plan   1. Neoplasm of uncertain behavior of skin  Left Upper Back  Pink, well-healing, superficial ulceration at recent biopsy site    Shave removal    Lesion length (cm):  0.8  Margin per side (cm):  0.2  Lesion diameter (cm):  1.2  Informed consent: discussed and consent obtained    Timeout: patient name, date of birth, surgical site, and procedure verified    Procedure prep:  Patient was prepped and draped  Anesthesia: the lesion was anesthetized in a standard fashion    Anesthetic:  1% lidocaine w/ epinephrine 1-100,000 local infiltration  Instrument used: flexible razor blade    Hemostasis achieved with: aluminum chloride    Outcome: patient tolerated procedure well    Post-procedure details: sterile dressing applied and wound care instructions given    Dressing type: bandage and petrolatum      Staff Communication: Dermatology Local Anesthesia: 1 % Lidocaine / Epinephrine - Amount:0.5ml    Specimen 1 - Dermatopathology- DERM LAB  Differential Diagnosis: DN; re-shave  Check Margins Yes/No?:    Comments:    Dermpath Lab: Routine Histopathology (formalin-fixed tissue)    Biopsy-proven mildly dysplastic junctional nevus -  left upper back, present on the deep and peripheral margins.  The atypical nature of this dysplastic nevus, its presence on the margins, and management options were discussed extensively with the patient in the office today.  Given the dysplastic nature of this nevus and its presence on the margins, I recommend re-excision of this lesion to ensure complete removal.  After discussing the risks and benefits of various options for excision, including horizontal removal via shave technique versus full-thickness surgical re-excision, the patient expressed understanding and wishes to undergo horizontal removal of this lesion via shave technique today.    2. Lichenoid keratosis  Left Shoulder - Posterior  Biopsy-proven lichenoid keratosis - left proximal shoulder.  The benign nature of this lesion was discussed with the patient today and reassurance provided.  He was informed this lesion may recur in the future, but no further treatment for this lesion is indicated at this time.  He expressed understanding and a sense of reassurance and is in agreement with this plan.    3. Melanocytic nevus of neck  Scattered on the patient's face, neck, trunk, and bilateral upper extremities, there are several small, round- to oval-shaped, brown-pigmented and pink-colored, symmetric, uniform-appearing macules and dome-shaped papules    Clinically benign- to slightly atypical-appearing nevi - the clinically benign- to slightly atypical-appearing nature of the patient's nevi was discussed with the patient today.  None of the patient's nevi meet threshold for biopsy today.  I emphasized the importance of performing monthly self-skin exams using the ABCDs of monitoring moles, which were reviewed with the patient today and an informational hand-out provided.  I also emphasized the importance of sun avoidance and sun protection with daily sunscreen use.  The patient expressed understanding and is in agreement with this plan.    4.  Lentigo  Photodistributed  Multiple tan- to light brown-colored, round- to oval-shaped, symmetric and uniform-appearing macules and small patches consistent with lentigines scattered in sun-exposed areas.    Solar Lentigines and photodamage.  The clinically benign-appearing nature of these lesions and their relation to chronic sun exposure were discussed with the patient today and reassurance provided.  None of these lesions meet threshold for biopsy today, and thus no treatment is medically indicated for these lesions at this time.  The signs and symptoms of skin cancer were reviewed and the patient was advised to practice sun protection and sun avoidance, use daily sunscreen, and perform regular self skin exams.  The patient was instructed to monitor these lesions for any changes, such as in size, shape, or color, or associated symptoms and to call our office to schedule a return visit for re-evaluation if any such changes or symptoms are noticed in the future.  The patient expressed understanding and is in agreement with this plan.    5. History of dysplastic nevus  On the patient's left upper back, there is a well-healing, superficial ulceration at his recent biopsy site    History of dysplastic nevus and photodamage.  I emphasized the importance of continuing to perform monthly self-skin exams using the ABCDs of monitoring moles, which were reviewed with the patient, as well as the importance of sun avoidance and sun protection with daily sunscreen use.  I will have the patient return to our office in 1 year for routine follow-up and skin exam, and the patient was instructed to call our office should the patient notice any new, changing, symptomatic, or otherwise concerning skin lesions before then.  The patient expressed understanding and is in agreement with this plan.    6. Diffuse photodamage of skin  Photodistributed  Diffuse photodamage with actinic changes with telangiectasia and mottled pigmentation in  sun-exposed areas.    Photodamage.  The signs and symptoms of skin cancer were reviewed and the patient was advised to practice sun protection and sun avoidance, use daily sunscreen, and perform regular self skin exams.  Sun protection was discussed, including avoiding the mid-day sun, wearing a sunscreen with SPF at least 50, and stressing the need for reapplication of sunscreen and applying more than they think they need.

## 2024-08-30 NOTE — TELEPHONE ENCOUNTER
Pt left 2 message this morning at 8;00AM --THAT Dr Conley called him  at  7;00pm last evening to go over bx results ? I returned call to pt went to  left message that his bx on upper left back was a MDJN and Dr baez did a reshave yesterday , we did not get those results back yet , possibly next wk tues or wed -- and the bx on pt shoulder was benign no further tx needed .. No signs of skin cancer seen ..told to stay calm and not to worry ..

## 2025-01-22 DIAGNOSIS — K64.2 GRADE III HEMORRHOIDS: ICD-10-CM

## 2025-01-22 RX ORDER — HYDROCORTISONE 25 MG/G
CREAM TOPICAL 2 TIMES DAILY
Qty: 30 G | Refills: 3 | Status: SHIPPED | OUTPATIENT
Start: 2025-01-22

## 2025-01-23 ENCOUNTER — TELEPHONE (OUTPATIENT)
Dept: GASTROENTEROLOGY | Facility: HOSPITAL | Age: 55
End: 2025-01-23
Payer: MEDICAID

## 2025-01-23 DIAGNOSIS — Z86.0100 HISTORY OF COLON POLYPS: Primary | ICD-10-CM

## 2025-01-23 RX ORDER — SODIUM, POTASSIUM,MAG SULFATES 17.5-3.13G
SOLUTION, RECONSTITUTED, ORAL ORAL
Qty: 354 ML | Refills: 0 | Status: SHIPPED | OUTPATIENT
Start: 2025-01-23

## 2025-01-23 NOTE — TELEPHONE ENCOUNTER
Patient will be due this year for follow up colonoscopy  Can an order be put into the system?  [phone]

## 2025-01-27 DIAGNOSIS — Z86.0100 HISTORY OF COLON POLYPS: ICD-10-CM

## 2025-01-27 RX ORDER — SODIUM, POTASSIUM,MAG SULFATES 17.5-3.13G
SOLUTION, RECONSTITUTED, ORAL ORAL
Qty: 354 ML | Refills: 0 | Status: SHIPPED | OUTPATIENT
Start: 2025-01-27

## 2025-01-30 ENCOUNTER — APPOINTMENT (OUTPATIENT)
Dept: PRIMARY CARE | Facility: CLINIC | Age: 55
End: 2025-01-30
Payer: MEDICAID

## 2025-02-26 ENCOUNTER — DOCUMENTATION (OUTPATIENT)
Dept: PHYSICAL THERAPY | Facility: CLINIC | Age: 55
End: 2025-02-26
Payer: MEDICAID

## 2025-02-26 NOTE — PROGRESS NOTES
Physical Therapy    Discharge Summary    Name: Glenn Kerns  MRN: [id number]  : 1970  Date: 25    Discharge Summary: PT    Discharge Information: Date of discharge 25, Date of last visit 3/28/24, Date of evaluation 24, and Number of attended visits 8    Therapy Summary: see last PT progress note    Discharge Status: other pain issues needed to be evaluated     Rehab Discharge Reason: Progress plateaued; further improvement possible

## 2025-04-11 DIAGNOSIS — E03.9 HYPOTHYROIDISM, UNSPECIFIED TYPE: ICD-10-CM

## 2025-04-13 RX ORDER — LEVOTHYROXINE SODIUM 112 UG/1
112 TABLET ORAL
Qty: 90 TABLET | Refills: 3 | Status: SHIPPED | OUTPATIENT
Start: 2025-04-13

## 2025-05-01 ENCOUNTER — APPOINTMENT (OUTPATIENT)
Dept: PRIMARY CARE | Facility: CLINIC | Age: 55
End: 2025-05-01
Payer: MEDICAID

## 2025-05-01 VITALS
DIASTOLIC BLOOD PRESSURE: 76 MMHG | OXYGEN SATURATION: 96 % | SYSTOLIC BLOOD PRESSURE: 117 MMHG | BODY MASS INDEX: 32.64 KG/M2 | WEIGHT: 228 LBS | TEMPERATURE: 98.5 F | HEIGHT: 70 IN | HEART RATE: 71 BPM

## 2025-05-01 DIAGNOSIS — E78.2 HYPERLIPIDEMIA, MIXED: ICD-10-CM

## 2025-05-01 DIAGNOSIS — Z83.3 FAMILY HISTORY OF DIABETES MELLITUS: ICD-10-CM

## 2025-05-01 DIAGNOSIS — Z00.00 HEALTHCARE MAINTENANCE: Primary | ICD-10-CM

## 2025-05-01 DIAGNOSIS — E55.9 HYPOVITAMINOSIS D: ICD-10-CM

## 2025-05-01 DIAGNOSIS — S22.080D COMPRESSION FRACTURE OF T11 VERTEBRA WITH ROUTINE HEALING, SUBSEQUENT ENCOUNTER: ICD-10-CM

## 2025-05-01 DIAGNOSIS — K64.2 GRADE III HEMORRHOIDS: ICD-10-CM

## 2025-05-01 DIAGNOSIS — Z12.5 PROSTATE CANCER SCREENING: ICD-10-CM

## 2025-05-01 DIAGNOSIS — Z86.0100 HISTORY OF COLONIC POLYPS: ICD-10-CM

## 2025-05-01 DIAGNOSIS — K64.9 HEMORRHOIDS, UNSPECIFIED HEMORRHOID TYPE: ICD-10-CM

## 2025-05-01 DIAGNOSIS — S22.080S COMPRESSION FRACTURE OF T11 VERTEBRA, SEQUELA: ICD-10-CM

## 2025-05-01 DIAGNOSIS — K59.01 CONSTIPATION, SLOW TRANSIT: ICD-10-CM

## 2025-05-01 DIAGNOSIS — M54.12 CERVICAL RADICULOPATHY: ICD-10-CM

## 2025-05-01 RX ORDER — HYDROCORTISONE 25 MG/G
CREAM TOPICAL 2 TIMES DAILY
Qty: 28 G | Refills: 3 | Status: SHIPPED | OUTPATIENT
Start: 2025-05-01

## 2025-05-01 ASSESSMENT — ENCOUNTER SYMPTOMS
DEPRESSION: 0
ACTIVITY CHANGE: 0
WEAKNESS: 0
SHORTNESS OF BREATH: 0
DIARRHEA: 0
SINUS PRESSURE: 0
CHEST TIGHTNESS: 0
NAUSEA: 0
SORE THROAT: 0
ARTHRALGIAS: 1
CHILLS: 0
MYALGIAS: 0
PALPITATIONS: 0
AGITATION: 0

## 2025-05-01 ASSESSMENT — PAIN SCALES - GENERAL: PAINLEVEL_OUTOF10: 0-NO PAIN

## 2025-05-01 ASSESSMENT — PATIENT HEALTH QUESTIONNAIRE - PHQ9
2. FEELING DOWN, DEPRESSED OR HOPELESS: NOT AT ALL
1. LITTLE INTEREST OR PLEASURE IN DOING THINGS: NOT AT ALL
SUM OF ALL RESPONSES TO PHQ9 QUESTIONS 1 AND 2: 0

## 2025-05-01 NOTE — PROGRESS NOTES
"Subjective   Patient ID: Glenn Kerns is a 55 y.o. male who presents for Annual Exam.  He is still playing [employer] regularly.  But has neck pain after 7 hours.  He also gets cupping.  He has done therapy but has hit a plateau. History of hemorrhoids that have bothered him more lately. He has a history of precancerous polyps and is scheduled to have a colonoscopy in July. He had a skin check and they found suspicious areas. Has chronic athletes feet.     HPI     Review of Systems   Constitutional:  Negative for activity change and chills.   HENT:  Negative for congestion, sinus pressure and sore throat.    Respiratory:  Negative for chest tightness and shortness of breath.    Cardiovascular:  Negative for chest pain and palpitations.   Gastrointestinal:  Negative for diarrhea and nausea.   Musculoskeletal:  Positive for arthralgias. Negative for myalgias.   Neurological:  Negative for weakness.   Psychiatric/Behavioral:  Negative for agitation and behavioral problems.        Objective   /76 (BP Location: Left arm, Patient Position: Sitting, BP Cuff Size: Large adult)   Pulse 71   Temp 36.9 °C (98.5 °F) (Temporal)   Ht 1.778 m (5' 10\")   Wt 103 kg (228 lb)   SpO2 96%   BMI 32.71 kg/m²     Physical Exam  Constitutional:       Appearance: Normal appearance.   HENT:      Head: Normocephalic and atraumatic.      Nose: Nose normal.      Mouth/Throat:      Mouth: Mucous membranes are moist.   Eyes:      Extraocular Movements: Extraocular movements intact.      Pupils: Pupils are equal, round, and reactive to light.   Cardiovascular:      Rate and Rhythm: Normal rate and regular rhythm.   Pulmonary:      Effort: No respiratory distress.      Breath sounds: No wheezing.   Abdominal:      General: Bowel sounds are normal.      Palpations: Abdomen is soft.   Neurological:      General: No focal deficit present.         Assessment/Plan   Assessment & Plan  Healthcare maintenance    Orders:    Comprehensive Metabolic " Panel; Future    Compression fracture of T11 vertebra, sequela         Cervical radiculopathy  Still a recurrent problem       Hemorrhoids, unspecified hemorrhoid type  Issues discussed.       Family history of diabetes mellitus  He wants to be screened due to his family history.  Orders:    Hemoglobin A1C; Future    History of colonic polyps  A colon check is schedules.       Constipation, slow transit  Will check.  Orders:    Thyroid Stimulating Hormone; Future    Thyroid Stimulating Hormone; Future    Prostate cancer screening    Orders:    Prostate Specific Antigen; Future    Hypovitaminosis D  Will check.    Orders:    Vitamin D 25-Hydroxy,Total (for eval of Vitamin D levels); Future    Hyperlipidemia, mixed  Will screen.  Orders:    Lipid Panel; Future    Grade III hemorrhoids  He will address this with the specialist.  Orders:    hydrocortisone (Anusol-HC) 2.5 % rectal cream; Insert into the rectum 2 times a day.    Compression fracture of T11 vertebra with routine healing, subsequent encounter  He still has related complaints periodically, but largely this is healed.

## 2025-05-01 NOTE — ASSESSMENT & PLAN NOTE
Will check.  Orders:    Thyroid Stimulating Hormone; Future    Thyroid Stimulating Hormone; Future

## 2025-05-01 NOTE — ASSESSMENT & PLAN NOTE
He will address this with the specialist.  Orders:    hydrocortisone (Anusol-HC) 2.5 % rectal cream; Insert into the rectum 2 times a day.

## 2025-05-02 LAB
25(OH)D3+25(OH)D2 SERPL-MCNC: 26 NG/ML (ref 30–100)
ALBUMIN SERPL-MCNC: 4.9 G/DL (ref 3.6–5.1)
ALP SERPL-CCNC: 51 U/L (ref 35–144)
ALT SERPL-CCNC: 32 U/L (ref 9–46)
ANION GAP SERPL CALCULATED.4IONS-SCNC: 10 MMOL/L (CALC) (ref 7–17)
AST SERPL-CCNC: 24 U/L (ref 10–35)
BILIRUB SERPL-MCNC: 0.9 MG/DL (ref 0.2–1.2)
BUN SERPL-MCNC: 16 MG/DL (ref 7–25)
CALCIUM SERPL-MCNC: 9.4 MG/DL (ref 8.6–10.3)
CHLORIDE SERPL-SCNC: 103 MMOL/L (ref 98–110)
CHOLEST SERPL-MCNC: 212 MG/DL
CHOLEST/HDLC SERPL: 4.2 (CALC)
CO2 SERPL-SCNC: 26 MMOL/L (ref 20–32)
CREAT SERPL-MCNC: 1.09 MG/DL (ref 0.7–1.3)
EGFRCR SERPLBLD CKD-EPI 2021: 80 ML/MIN/1.73M2
EST. AVERAGE GLUCOSE BLD GHB EST-MCNC: 117 MG/DL
EST. AVERAGE GLUCOSE BLD GHB EST-SCNC: 6.5 MMOL/L
GLUCOSE SERPL-MCNC: 86 MG/DL (ref 65–99)
HBA1C MFR BLD: 5.7 %
HDLC SERPL-MCNC: 50 MG/DL
LDLC SERPL CALC-MCNC: 137 MG/DL (CALC)
NONHDLC SERPL-MCNC: 162 MG/DL (CALC)
POTASSIUM SERPL-SCNC: 4.6 MMOL/L (ref 3.5–5.3)
PROT SERPL-MCNC: 7.3 G/DL (ref 6.1–8.1)
PSA SERPL-MCNC: 0.72 NG/ML
SODIUM SERPL-SCNC: 139 MMOL/L (ref 135–146)
TRIGL SERPL-MCNC: 124 MG/DL
TSH SERPL-ACNC: 3.42 MIU/L (ref 0.4–4.5)

## 2025-05-29 ENCOUNTER — OFFICE VISIT (OUTPATIENT)
Dept: PRIMARY CARE | Facility: CLINIC | Age: 55
End: 2025-05-29
Payer: MEDICAID

## 2025-05-29 VITALS
HEART RATE: 58 BPM | WEIGHT: 220 LBS | BODY MASS INDEX: 31.5 KG/M2 | OXYGEN SATURATION: 98 % | TEMPERATURE: 98.3 F | DIASTOLIC BLOOD PRESSURE: 82 MMHG | HEIGHT: 70 IN | SYSTOLIC BLOOD PRESSURE: 131 MMHG

## 2025-05-29 DIAGNOSIS — R09.81 NASAL CONGESTION: ICD-10-CM

## 2025-05-29 DIAGNOSIS — H93.8X2 EAR FULLNESS, LEFT: ICD-10-CM

## 2025-05-29 DIAGNOSIS — H90.3 BILATERAL SENSORINEURAL HEARING LOSS: Primary | ICD-10-CM

## 2025-05-29 PROBLEM — S22.080A COMPRESSION FRACTURE OF T11 VERTEBRA (MULTI): Status: RESOLVED | Noted: 2023-04-17 | Resolved: 2025-05-29

## 2025-05-29 PROCEDURE — 99214 OFFICE O/P EST MOD 30 MIN: CPT | Performed by: INTERNAL MEDICINE

## 2025-05-29 PROCEDURE — 3008F BODY MASS INDEX DOCD: CPT | Performed by: INTERNAL MEDICINE

## 2025-05-29 PROCEDURE — 1036F TOBACCO NON-USER: CPT | Performed by: INTERNAL MEDICINE

## 2025-05-29 ASSESSMENT — ENCOUNTER SYMPTOMS
SINUS PRESSURE: 0
NAUSEA: 0
PALPITATIONS: 0
ACTIVITY CHANGE: 0
SORE THROAT: 0
MYALGIAS: 0
AGITATION: 0
DIARRHEA: 0
WEAKNESS: 0
CHILLS: 0
DEPRESSION: 0
SHORTNESS OF BREATH: 0
CHEST TIGHTNESS: 0

## 2025-05-29 NOTE — PROGRESS NOTES
"Subjective   Patient ID: Glenn Kerns is a 55 y.o. male who presents for Sick Visit (Right Ear discomfort x 3 weeks /Sinus concerns ).   History of hyperacusis from loud music (in the past).  He felt an episode of feeling light he is na carvern.  He saw an audiologist and she saw wax against the TM.  He has post-nasal drip.   Earache   There is pain in both ears. The current episode started 1 to 4 weeks ago. Associated symptoms include hearing loss. Pertinent negatives include no diarrhea or sore throat.        Review of Systems   Constitutional:  Negative for activity change and chills.   HENT:  Positive for ear pain and hearing loss. Negative for congestion, sinus pressure and sore throat.    Respiratory:  Negative for chest tightness and shortness of breath.    Cardiovascular:  Negative for chest pain and palpitations.   Gastrointestinal:  Negative for diarrhea and nausea.   Musculoskeletal:  Negative for myalgias.   Neurological:  Negative for weakness.   Psychiatric/Behavioral:  Negative for agitation and behavioral problems.        Objective   /82 (BP Location: Left arm, Patient Position: Sitting, BP Cuff Size: Large adult)   Pulse 58   Temp 36.8 °C (98.3 °F) (Temporal)   Ht 1.778 m (5' 10\")   Wt 99.8 kg (220 lb)   SpO2 98%   BMI 31.57 kg/m²     Physical Exam  Constitutional:       Appearance: Normal appearance.   HENT:      Head: Normocephalic and atraumatic.      Right Ear: Tympanic membrane normal.      Left Ear: Tympanic membrane normal.      Nose: Nose normal.      Mouth/Throat:      Mouth: Mucous membranes are moist.   Cardiovascular:      Rate and Rhythm: Normal rate and regular rhythm.   Pulmonary:      Effort: No respiratory distress.      Breath sounds: No wheezing.   Neurological:      General: No focal deficit present.       Assessment/Plan   Assessment & Plan  Bilateral sensorineural hearing loss  Issues discussed.        Ear fullness, left  Refer to ENT       Nasal congestion  Allergy " related.

## 2025-05-29 NOTE — ASSESSMENT & PLAN NOTE
Allergy related.        Vital Signs Last 24 Hrs  T(C): 37 (19 Dec 2018 22:43), Max: 37.1 (19 Dec 2018 21:42)  T(F): 98.6 (19 Dec 2018 22:43), Max: 98.7 (19 Dec 2018 21:42)  HR: 61 (19 Dec 2018 22:43) (61 - 89)  BP: 139/94 (19 Dec 2018 22:43) (139/94 - 152/98)  BP(mean): --  RR: 18 (19 Dec 2018 22:43) (18 - 19)  SpO2: 97% (19 Dec 2018 22:43) (97% - 99%)    PHYSICAL EXAM:  GENERAL: NAD, well-groomed, well-developed  HEAD:  Atraumatic, Normocephalic  EYES: EOMI, PERRLA, conjunctiva and sclera clear  ENMT: No tonsillar erythema, exudates, or enlargement; Moist mucous membranes, Good dentition, No lesions  NECK: Supple, No JVD, Normal thyroid  NERVOUS SYSTEM: Alert, oriented x 3, CN 2-12 intact, no focal deficits, bilateral coarse tremor  CHEST/LUNG: Clear to percussion bilaterally; No rales, rhonchi, wheezing, or rubs  HEART: Regular rate and rhythm; No murmurs, rubs, or gallops  ABDOMEN: Soft, Nontender, Nondistended; Bowel sounds present  EXTREMITIES:  + Peripheral Pulses, No clubbing, cyanosis, or edema  LYMPH: No lymphadenopathy noted  SKIN: No rashes or lesions

## 2025-06-05 ENCOUNTER — APPOINTMENT (OUTPATIENT)
Dept: PRIMARY CARE | Facility: CLINIC | Age: 55
End: 2025-06-05
Payer: MEDICAID

## 2025-07-02 ENCOUNTER — OFFICE VISIT (OUTPATIENT)
Dept: OTOLARYNGOLOGY | Facility: CLINIC | Age: 55
End: 2025-07-02
Payer: MEDICAID

## 2025-07-02 VITALS — BODY MASS INDEX: 31.71 KG/M2 | WEIGHT: 221 LBS

## 2025-07-02 DIAGNOSIS — H93.8X2 EAR FULLNESS, LEFT: ICD-10-CM

## 2025-07-02 DIAGNOSIS — R09.81 NASAL CONGESTION: ICD-10-CM

## 2025-07-02 DIAGNOSIS — H90.3 BILATERAL SENSORINEURAL HEARING LOSS: ICD-10-CM

## 2025-07-02 DIAGNOSIS — H69.90 DYSFUNCTION OF EUSTACHIAN TUBE, UNSPECIFIED LATERALITY: ICD-10-CM

## 2025-07-02 DIAGNOSIS — K21.9 LARYNGOPHARYNGEAL REFLUX (LPR): ICD-10-CM

## 2025-07-02 DIAGNOSIS — J31.0 CHRONIC RHINITIS: ICD-10-CM

## 2025-07-02 PROCEDURE — 99204 OFFICE O/P NEW MOD 45 MIN: CPT | Performed by: GENERAL PRACTICE

## 2025-07-02 RX ORDER — CETIRIZINE HYDROCHLORIDE 10 MG/1
10 TABLET ORAL DAILY PRN
Qty: 30 TABLET | Refills: 2 | Status: SHIPPED | OUTPATIENT
Start: 2025-07-02 | End: 2026-07-02

## 2025-07-02 RX ORDER — FAMOTIDINE 20 MG/1
20 TABLET, FILM COATED ORAL NIGHTLY
Qty: 30 TABLET | Refills: 3 | Status: SHIPPED | OUTPATIENT
Start: 2025-07-02 | End: 2026-07-02

## 2025-07-02 RX ORDER — TRIAMCINOLONE ACETONIDE 55 UG/1
2 SPRAY, METERED NASAL DAILY
Qty: 16.5 G | Refills: 3 | Status: SHIPPED | OUTPATIENT
Start: 2025-07-02 | End: 2026-07-02

## 2025-07-02 NOTE — PROGRESS NOTES
Otolaryngology - Head and Neck Surgery Outpatient New Patient Visit Note        Assessment/Plan:   Problem List Items Addressed This Visit           ICD-10-CM       ENT    Bilateral sensorineural hearing loss H90.3    Ear fullness, left H93.8X2    Nasal congestion R09.81     Other Visit Diagnoses         Codes      Laryngopharyngeal reflux (LPR)     K21.9    Relevant Medications    famotidine (Pepcid) 20 mg tablet      Dysfunction of Eustachian tube, unspecified laterality     H69.90    Relevant Medications    triamcinolone (Nasacort) 55 mcg nasal inhaler    cetirizine (ZyrTEC) 10 mg tablet      Chronic rhinitis     J31.0    Relevant Medications    triamcinolone (Nasacort) 55 mcg nasal inhaler    cetirizine (ZyrTEC) 10 mg tablet              55yoM with bothersome tinnitus and hyperacusis in the setting of high frequency SNHL attributed to noise exposure.   ETD contributing to symptoms, discussed controls for rhinitis and LPR     Discussed conservative management of reflux, and risks of long term anti-reflux medications.  Discussed avoidance of triggers, dietary and behavioral management strategies for reflux.  Discussed possible referral to GI for further testing and evaluation.  Will trial maximal medical therapy (combination PPI and H2 blockers) for 1-2 months and assess for symptom response.  Plan for taper of medical management to least possible to control symptoms, in favor of using dietary/behavioral controls.         Follow up:  -plan for follow up in clinic as needed and in 1-2 months    All of the above findings, impressions, treatment planning and follow up plans were discussed with the patient who indicated understanding.  the patient was instructed to contact or return to clinic sooner if symptoms/signs persist or worsen despite the above management.      Danilo Dunlap MD  Otolaryngology - Head and Neck Surgery            History Of Present Illness  Glenn Kerns is a 55 y.o. male presenting for concerns  for tinnitus, hearing loss, hyperacusis.     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    The patient reports slow progression of the hearing loss over time.  The paitent reports a history of intermittent, waxing/waning, nonpulsatile, tonal tinnitus which does   interfere with hearing.   Also with hyperacusis for certain frequencies.   Wears musicians ear plugs and ear molds with some effect, but some local irritation in ears.      The patient reports a history of significant noise exposure due to occupational exposures, industrial noise, etc.     The patient denies sudden changes in hearing.  The patient denies otalgia, otorrhea, vertigo, or facial weakness.    The patient denies a history of otologic surgery or trauma.  The patient denies a history of blast injury, TBI or concussion associated with hearing loss.  The patient denies a family history of significant hearing loss.  The patient reports a history of AOM as a child, but no recent significant history of ear infection.  No reported exposure to known ototoxins (chemotherapeutics, aminoglycosides, loop diurectics, high dose NSAIDs).   No reported history of radiation treatment to the head/neck.       Notes nasal obstruction/congestion  Some allergic rhinitis, no recent sinusitis    Notes a history of GERD, with some control with PPI.   Some AM globus and pharyngeal mucous     History of neck tension, radiculopathy and prior Cspine surgeyr.       Past Medical History  He has a past medical history of Personal history of other diseases of the digestive system (02/03/2022), Personal history of other endocrine, nutritional and metabolic disease (02/03/2022), Personal history of other infectious and parasitic diseases (02/03/2022), and Tinnitus.    Surgical History  He has no past surgical history on file.     Social History  He reports that he has never smoked. He has never used smokeless tobacco. He reports that he does not  currently use alcohol after a past usage of about 4.0 standard drinks of alcohol per week. He reports that he does not use drugs.    Family History  Family History[1]     Allergies  Lactase    Review of Systems  ROS: Pertinent positives as noted in HPI.    - CONSTITUTIONAL: Does not report weight loss, fever or chills.    - HEENT:   Ear: Does not report  , vertigo,    , otalgia, otorrhea  Nose: Does not report  ,  , epistaxis, decreased smell  Throat: Does not report pain, dysphagia, odynophagia  Larynx: Does not report hoarseness,  difficulty breathing, pain with speaking (odynophonia)  Neck: Does not report new masses, pain, swelling  Face: Does not report sinus pain, pressure, swelling, numbness, weakness     - RESPIRATORY: Does not report SOB or cough.    - CV: Does not report palpitations or chest pain.     - GI: Does not report abdominal pain, nausea, vomiting or diarrhea.    - : Does not report dysuria or urinary frequency.    - MSK: Does not report myalgia or joint pain.    - SKIN: Does not report rash or pruritus.    - NEUROLOGICAL: Does not report headache or syncope.    - PSYCHIATRIC: Does not report recent changes in mood. Does not report anxiety or depression.         Physical Exam:     GENERAL:   Alert & Oriented to person, place and time; Normal affect and appearance. Well developed and well nourished. Conversant & cooperative with examination.     HEAD:   Normocephalic, atraumatic. No sinus tenderness to palpation. Normal parotid bilaterally. Normal facial strength.     NEUROLOGIC:   Cranial nerves II-XII grossly intact, gait WNL. Normal mood and affect.    EYES:   Extraocular movements intact. Pupils equal, round, reactive to light and accommodation. No nystagmus, no ptosis. no scleral injection.    EAR:   Normal auricle. No discomfort or TTP with manipulation.   Handheld otoscopic exam showed normal external auditory canals bilaterally. No purulence or EAC inflammation. Minimal cerumen.   Right  tympanic membrane clear and mobile without evidence of perforation, retraction or middle ear effusion.   Left tympanic membrane clear and mobile without evidence of perforation, retraction or middle ear effusion.     NOSE:   No external deformity. No external nasal lesions, lacerations, or scars. Nasal tip symmetrical with normal nasal valves.   Nasal cavity with essentially midline septum, edematous  mucosa and turbinates. No lesions, masses, purulence or polyps.     OC/OP:   Mucous membranes moist, no masses, lesions or exudates.   Normal tongue, floor of mouth, teeth, gums, lips. Normal posterior pharyngeal wall.    Normal tonsils without erythema, exudate or obvious calculi     NECK:   No neck masses or thyroid enlargement. Trachea midline. No tenderness to palpation    LYMPHATIC:   No cervical lymphadenopathy.     RESPIRATORY:   Symmetric chest elevation & no retractions. No significant hoarseness. No increased work of breathing.    CV:   No clubbing or cyanosis. No obvious edema    Skin:   No facial rashes, vesicles or lesions.     Extremities:   No gross abnormalities      Clinic Procedure        Information review:  External sources (notes, imaging, lab results) listed below personally reviewed to aid in medical decision making process.  -PCM 5/29/25  -PCM 5/1/25  -PT 8/9/24         [1]   Family History  Problem Relation Name Age of Onset    Heart disease Paternal Grandfather

## 2025-07-16 ENCOUNTER — APPOINTMENT (OUTPATIENT)
Dept: OTOLARYNGOLOGY | Facility: CLINIC | Age: 55
End: 2025-07-16
Payer: MEDICAID

## 2025-07-28 ENCOUNTER — HOSPITAL ENCOUNTER (OUTPATIENT)
Dept: GASTROENTEROLOGY | Facility: HOSPITAL | Age: 55
Discharge: HOME | End: 2025-07-28
Payer: MEDICAID

## 2025-07-28 ENCOUNTER — ANESTHESIA (OUTPATIENT)
Dept: GASTROENTEROLOGY | Facility: HOSPITAL | Age: 55
End: 2025-07-28
Payer: MEDICAID

## 2025-07-28 ENCOUNTER — ANESTHESIA EVENT (OUTPATIENT)
Dept: GASTROENTEROLOGY | Facility: HOSPITAL | Age: 55
End: 2025-07-28
Payer: MEDICAID

## 2025-07-28 VITALS
BODY MASS INDEX: 30.74 KG/M2 | HEIGHT: 70 IN | TEMPERATURE: 97.7 F | DIASTOLIC BLOOD PRESSURE: 71 MMHG | SYSTOLIC BLOOD PRESSURE: 125 MMHG | WEIGHT: 214.73 LBS | RESPIRATION RATE: 16 BRPM | OXYGEN SATURATION: 98 % | HEART RATE: 72 BPM

## 2025-07-28 DIAGNOSIS — Z86.0100 HISTORY OF COLON POLYPS: ICD-10-CM

## 2025-07-28 DIAGNOSIS — K64.2 GRADE III HEMORRHOIDS: ICD-10-CM

## 2025-07-28 PROBLEM — R09.89 SINUS SYMPTOM: Status: ACTIVE | Noted: 2025-07-28

## 2025-07-28 PROCEDURE — A45385 PR COLONOSCOPY,REMV LESN,SNARE: Performed by: ANESTHESIOLOGY

## 2025-07-28 PROCEDURE — 45385 COLONOSCOPY W/LESION REMOVAL: CPT | Performed by: INTERNAL MEDICINE

## 2025-07-28 PROCEDURE — 2500000004 HC RX 250 GENERAL PHARMACY W/ HCPCS (ALT 636 FOR OP/ED): Mod: JZ

## 2025-07-28 PROCEDURE — 3700000001 HC GENERAL ANESTHESIA TIME - INITIAL BASE CHARGE

## 2025-07-28 PROCEDURE — 7100000010 HC PHASE TWO TIME - EACH INCREMENTAL 1 MINUTE

## 2025-07-28 PROCEDURE — 7100000009 HC PHASE TWO TIME - INITIAL BASE CHARGE

## 2025-07-28 PROCEDURE — 3700000002 HC GENERAL ANESTHESIA TIME - EACH INCREMENTAL 1 MINUTE

## 2025-07-28 PROCEDURE — A45385 PR COLONOSCOPY,REMV LESN,SNARE

## 2025-07-28 RX ORDER — PROPOFOL 10 MG/ML
INJECTION, EMULSION INTRAVENOUS AS NEEDED
Status: DISCONTINUED | OUTPATIENT
Start: 2025-07-28 | End: 2025-07-28

## 2025-07-28 RX ORDER — FENTANYL CITRATE 50 UG/ML
INJECTION, SOLUTION INTRAMUSCULAR; INTRAVENOUS AS NEEDED
Status: DISCONTINUED | OUTPATIENT
Start: 2025-07-28 | End: 2025-07-28

## 2025-07-28 RX ORDER — LIDOCAINE HYDROCHLORIDE 20 MG/ML
INJECTION, SOLUTION EPIDURAL; INFILTRATION; INTRACAUDAL; PERINEURAL AS NEEDED
Status: DISCONTINUED | OUTPATIENT
Start: 2025-07-28 | End: 2025-07-28

## 2025-07-28 RX ORDER — HYDROCORTISONE 25 MG/G
CREAM TOPICAL 2 TIMES DAILY
Qty: 28 G | Refills: 3 | Status: SHIPPED | OUTPATIENT
Start: 2025-07-28

## 2025-07-28 RX ORDER — MIDAZOLAM HYDROCHLORIDE 2 MG/2ML
INJECTION, SOLUTION INTRAMUSCULAR; INTRAVENOUS AS NEEDED
Status: DISCONTINUED | OUTPATIENT
Start: 2025-07-28 | End: 2025-07-28

## 2025-07-28 RX ADMIN — MIDAZOLAM HYDROCHLORIDE 2 MG: 1 INJECTION, SOLUTION INTRAMUSCULAR; INTRAVENOUS at 08:52

## 2025-07-28 RX ADMIN — PROPOFOL 175 MCG/KG/MIN: 10 INJECTION, EMULSION INTRAVENOUS at 08:53

## 2025-07-28 RX ADMIN — LIDOCAINE HYDROCHLORIDE 100 MG: 20 INJECTION, SOLUTION EPIDURAL; INFILTRATION; INTRACAUDAL; PERINEURAL at 08:52

## 2025-07-28 RX ADMIN — FENTANYL CITRATE 50 MCG: 50 INJECTION, SOLUTION INTRAMUSCULAR; INTRAVENOUS at 08:57

## 2025-07-28 RX ADMIN — PROPOFOL 40 MG: 10 INJECTION, EMULSION INTRAVENOUS at 08:52

## 2025-07-28 ASSESSMENT — ENCOUNTER SYMPTOMS: CONSTITUTIONAL NEGATIVE: 1

## 2025-07-28 ASSESSMENT — PAIN SCALES - GENERAL
PAINLEVEL_OUTOF10: 0 - NO PAIN
PAINLEVEL_OUTOF10: 0 - NO PAIN
PAIN_LEVEL: 0
PAINLEVEL_OUTOF10: 4
PAINLEVEL_OUTOF10: 0 - NO PAIN

## 2025-07-28 ASSESSMENT — PAIN - FUNCTIONAL ASSESSMENT
PAIN_FUNCTIONAL_ASSESSMENT: 0-10

## 2025-07-28 ASSESSMENT — COLUMBIA-SUICIDE SEVERITY RATING SCALE - C-SSRS
1. IN THE PAST MONTH, HAVE YOU WISHED YOU WERE DEAD OR WISHED YOU COULD GO TO SLEEP AND NOT WAKE UP?: NO
6. HAVE YOU EVER DONE ANYTHING, STARTED TO DO ANYTHING, OR PREPARED TO DO ANYTHING TO END YOUR LIFE?: NO
2. HAVE YOU ACTUALLY HAD ANY THOUGHTS OF KILLING YOURSELF?: NO

## 2025-07-28 ASSESSMENT — PAIN DESCRIPTION - DESCRIPTORS: DESCRIPTORS: ACHING

## 2025-07-28 NOTE — H&P
"History Of Present Illness  Glenn Kerns is a 55 y.o. male presenting with history of non-advanced TA in the transverse colon removed n 2022 by Dr. Borjas here for surveillance colonoscopy.     Past Medical History  Medical History[1]  Surgical History  Surgical History[2]  Social History  He reports that he has never smoked. He has never used smokeless tobacco. He reports that he does not currently use alcohol after a past usage of about 3.0 standard drinks of alcohol per week. He reports current drug use. Drug: Marijuana.    Family History  Family History[3]     Allergies  Allergies[4]  Review of Systems   Constitutional: Negative.         Physical Exam  Constitutional:       Appearance: Normal appearance.     Cardiovascular:      Rate and Rhythm: Normal rate and regular rhythm.   Pulmonary:      Effort: Pulmonary effort is normal.      Breath sounds: Normal breath sounds.   Abdominal:      Palpations: Abdomen is soft.     Neurological:      Mental Status: He is alert.     Psychiatric:         Mood and Affect: Mood normal.         Behavior: Behavior normal.         Thought Content: Thought content normal.         Judgment: Judgment normal.          Last Recorded Vitals  Blood pressure 136/80, pulse 81, temperature 36.4 °C (97.5 °F), temperature source Temporal, resp. rate 12, height 1.778 m (5' 10\"), weight 97.4 kg (214 lb 11.7 oz), SpO2 100%.    Assessment/Plan   Colonoscopy for TA surveillance as planned     Glenn Mae DO       [1]   Past Medical History:  Diagnosis Date    GERD (gastroesophageal reflux disease)     Personal history of other diseases of the digestive system 02/03/2022    History of hemorrhoids    Personal history of other endocrine, nutritional and metabolic disease 02/03/2022    History of hypothyroidism    Personal history of other infectious and parasitic diseases 02/03/2022    History of tinea pedis    Tinnitus    [2] History reviewed. No pertinent surgical history.  [3]   Family " History  Problem Relation Name Age of Onset    Heart disease Paternal Grandfather     [4]   Allergies  Allergen Reactions    Lactase Unknown     Test results regarding Chinese medicine diagnosis.

## 2025-07-28 NOTE — ANESTHESIA POSTPROCEDURE EVALUATION
Patient: Glenn Kerns    Procedure Summary       Date: 07/28/25 Room / Location: [facility]    Anesthesia Start: 0849 Anesthesia Stop: 0912    Procedure: COLONOSCOPY Diagnosis: History of colon polyps    Scheduled Providers: Glenn Mae DO; Cedric Mclain MD; EARLENE Hathaway Responsible Provider: Cedric Mclain MD    Anesthesia Type: MAC ASA Status: 3            Anesthesia Type: MAC    Vitals Value Taken Time   BP 98/68 07/28/25 09:26   Temp 36.5 °C (97.7 °F) 07/28/25 09:11   Pulse 66 07/28/25 09:26   Resp 19 07/28/25 09:26   SpO2 95 % 07/28/25 09:26       Anesthesia Post Evaluation    Patient location during evaluation: bedside  Patient participation: complete - patient cannot participate  Level of consciousness: awake  Pain score: 0  Pain management: adequate  Airway patency: patent  Cardiovascular status: acceptable and hemodynamically stable  Respiratory status: acceptable and nonlabored ventilation  Hydration status: acceptable  Postoperative Nausea and Vomiting: none        There were no known notable events for this encounter.

## 2025-07-28 NOTE — DISCHARGE INSTRUCTIONS

## 2025-07-28 NOTE — ANESTHESIA PREPROCEDURE EVALUATION
Patient: Glenn Kerns    Procedure Information       Date/Time: 07/28/25 0950    Scheduled providers: Glenn Mae DO; Cedric Mclain MD; EARLENE Hathaway    Procedure: COLONOSCOPY    Location: [facility]            Relevant Problems   Anesthesia (within normal limits)      Cardiac   (+) Hyperlipidemia, mixed      Pulmonary  Marijuana smoker      Neuro   (+) Cervical radiculopathy   (+) Lumbar radiculopathy      GI  Colon polyp   (+) Acid reflux      Endocrine   (+) Acquired hypothyroidism      HEENT   (+) Bilateral sensorineural hearing loss   (+) Sinus symptom      ID   (+) Tinea pedis of both feet                                                         Pre-Anesthesia Evaluation                                         Glenn Kerns is a 55 y.o. male who presents for the above mentioned procedure due to History of colon polyps [Z86.0100]    Medical History[1]  Surgical History[2]  Social History[3]  RX Allergies[4]  Current Medications[5]  Prior to Admission medications   Medication Sig Start Date End Date Taking? Authorizing Provider   cetirizine (ZyrTEC) 10 mg tablet Take 1 tablet (10 mg) by mouth once daily as needed for allergies. 7/2/25 7/2/26  Danilo Dunlap MD   clindamycin (Cleocin T) 1 % lotion Apply topically 2 times a day. 8/22/24 8/22/25  Tono Conley MD   clotrimazole (Lotrimin) 1 % external solution Apply topically 2 times a day. 1/9/23   Historical Provider, MD maharajrimazole-betamethasone (Lotrisone) cream Apply 1 Application topically 2 times a day. 1/8/24   Historical Provider, MD   famotidine (Pepcid) 20 mg tablet Take 1 tablet (20 mg) by mouth once daily at bedtime. 7/2/25 7/2/26  Danilo Dunlap MD   hydrocortisone (Anusol-HC) 2.5 % rectal cream Insert into the rectum 2 times a day. 5/1/25   Vineet Bernard MD   ketoconazole (NIZOral) 2 % cream Apply topically 2 times a day. 6/8/21   Historical Provider, MD   ketoconazole (NIZOral) 2 % shampoo Wash affected areas of scalp  "2-3 times weekly as directed  Patient not taking: Reported on 7/21/2025 8/22/24   Tono Conley MD   levothyroxine (Synthroid, Levoxyl) 112 mcg tablet TAKE 1 TABLET BY MOUTH ONCE DAILY IN THE MORNING BEFORE BREAKFAST 4/13/25   Vineet Bernard MD   sodium,potassium,mag sulfates (Suprep Bowel Prep Kit) 17.5-3.13-1.6 gram solution Take one bottle twice as directed by the prep instructions 1/27/25   Glenn Mae DO   triamcinolone (Nasacort) 55 mcg nasal inhaler Administer 2 sprays into each nostril once daily. 7/2/25 7/2/26  Danilo Dunlap MD     No medication comments found.  Visit Vitals  Smoking Status Never                             5/29/2025     2:00 PM 5/1/2025     1:31 PM 4/25/2024    12:59 PM 3/26/2024    12:08 PM 10/24/2023     1:23 PM 10/10/2023    12:44 PM 9/11/2023     2:25 PM   BP REVIEW   BP (ultimate) 131/82 117/76 133/68 126/82 125/83 132/86 145/83     No results for input(s): \"WBC\", \"HGB\", \"HCT\", \"PLT\", \"MCV\" in the last 25763 hours.  Recent Labs     05/01/25  1428 04/22/24  0728   EGFR 80 77   ANIONGAP 10 13   BUN 16 15   CREATININE 1.09 1.13    141   K 4.6 3.7    103   CO2 26 29   GLUCOSE 86 86   CALCIUM 9.4 9.0     Recent Labs     05/01/25  1428 10/10/23  1400 04/17/23  1420   HGBA1C 5.7*   < >  --    TSH 3.42  --  1.63    < > = values in this interval not displayed.     Recent Labs     05/01/25  1428 04/22/24  0728   BILITOT 0.9 0.8   PROT 7.3 6.8   ALBUMIN 4.9 4.6   ALKPHOS 51 45   ALT 32 31   AST 24 22     No results for input(s): \"PROTIME\", \"INR\" in the last 93513 hours.  No results found for: \"ABO\", \"LABRH\", \"ABSCRN\"  No results found for: \"FERRITIN\", \"TIBC\", \"IRONSAT\"  No results found for: \"STAPHMRSASCR\"  No results for input(s): \"AMPHETAMINE\", \"MAMPHBLDS\", \"BARBITURATE\", \"BENZODIAZ\", \"BUPRENBLDS\", \"CANNABBLDS\", \"COCBLDS\", \"METHABLDS\", \"OXYBLDS\", \"PCPBLDS\", \"OPIATBLDS\", \"DRBLDCOMM\" in the last 64586 hours.  No results for input(s): \"PHART\", \"HOT4MHY\", \"PO2ART\", \"MGV0RNA\", " "\"E0XLYGKG\", \"BEART\", \"T4ECUBNG\" in the last 20500 hours.        EKG No results found for this or any previous visit (from the past 4464 hours).  Ejection Fractions:No results found for: \"EF\"  Echocardiogram   No results found for this or any previous visit from the past 58101 days.     Stress TestingNo results found for this or any previous visit from the past 93050 days.    Cardiac CatheterizationNo results found for this or any previous visit from the past 80877 days.    Cardiac Scoring No results found for this or any previous visit from the past 23317 days.    AAA screenNo results found for this or any previous visit from the past 77505 days.    Carotid DopplerNo results found for this or any previous visit from the past 38485 days.    X Ray === 05/03/22 ===    XR THORACOLUMBAR SPINE 2 VIEWS    - Impression -  Unchanged mild T11 vertebral body compression fracture with  associated mild focal kyphosis.  Pulmonary Function Tests  No results found for: \"FEV1\", \"FVC\", \"MYH8NYW\", \"TLC\", \"DLCO\"   OTHER: No results found for this or any previous visit from the past 1825 days.    .      No results found for: \"PREGTESTUR\", \"PREGSERUM\", \"HCG\", \"HCGQUANT\"  The 10-year ASCVD risk score (Ananya GRUBBS, et al., 2019) is: 6.2%    Values used to calculate the score:      Age: 55 years      Clincally relevant sex: Male      Is Non- : No      Diabetic: No      Tobacco smoker: No      Systolic Blood Pressure: 131 mmHg      Is BP treated: No      HDL Cholesterol: 50 mg/dL      Total Cholesterol: 212 mg/dL    Code Status: Assume Full                   Clinical information reviewed:                   NPO Detail:  No data recorded     Physical Exam    Airway  Mallampati: I     Cardiovascular    Dental    Pulmonary    Abdominal            Anesthesia Plan    History of general anesthesia?: yes  History of complications of general anesthesia?: no    ASA 3     MAC     intravenous induction   Anesthetic plan and risks " discussed with patient.             [1]   Past Medical History:  Diagnosis Date    GERD (gastroesophageal reflux disease)     Personal history of other diseases of the digestive system 02/03/2022    History of hemorrhoids    Personal history of other endocrine, nutritional and metabolic disease 02/03/2022    History of hypothyroidism    Personal history of other infectious and parasitic diseases 02/03/2022    History of tinea pedis    Tinnitus    [2] No past surgical history on file.  [3]   Social History  Tobacco Use    Smoking status: Never    Smokeless tobacco: Never   Vaping Use    Vaping status: Never Used   Substance Use Topics    Alcohol use: Not Currently     Alcohol/week: 3.0 standard drinks of alcohol     Types: 3 Cans of beer per week    Drug use: Yes     Types: Marijuana   [4]   Allergies  Allergen Reactions    Lactase Unknown     Test results regarding Chinese medicine diagnosis.   [5]   Current Outpatient Medications:     cetirizine (ZyrTEC) 10 mg tablet, Take 1 tablet (10 mg) by mouth once daily as needed for allergies., Disp: 30 tablet, Rfl: 2    clindamycin (Cleocin T) 1 % lotion, Apply topically 2 times a day., Disp: 60 mL, Rfl: 11    clotrimazole (Lotrimin) 1 % external solution, Apply topically 2 times a day., Disp: , Rfl:     clotrimazole-betamethasone (Lotrisone) cream, Apply 1 Application topically 2 times a day., Disp: , Rfl:     famotidine (Pepcid) 20 mg tablet, Take 1 tablet (20 mg) by mouth once daily at bedtime., Disp: 30 tablet, Rfl: 3    hydrocortisone (Anusol-HC) 2.5 % rectal cream, Insert into the rectum 2 times a day., Disp: 28 g, Rfl: 3    ketoconazole (NIZOral) 2 % cream, Apply topically 2 times a day., Disp: , Rfl:     ketoconazole (NIZOral) 2 % shampoo, Wash affected areas of scalp 2-3 times weekly as directed (Patient not taking: Reported on 7/21/2025), Disp: 120 mL, Rfl: 11    levothyroxine (Synthroid, Levoxyl) 112 mcg tablet, TAKE 1 TABLET BY MOUTH ONCE DAILY IN THE MORNING  BEFORE BREAKFAST, Disp: 90 tablet, Rfl: 3    sodium,potassium,mag sulfates (Suprep Bowel Prep Kit) 17.5-3.13-1.6 gram solution, Take one bottle twice as directed by the prep instructions, Disp: 354 mL, Rfl: 0    triamcinolone (Nasacort) 55 mcg nasal inhaler, Administer 2 sprays into each nostril once daily., Disp: 16.5 g, Rfl: 3

## 2025-07-28 NOTE — PERIOPERATIVE NURSING NOTE
911: Phase 2 care begins  949: Discharge instructions reviewed with pt   and family  1005: Dr. Mae bedside speaking to pt and family  1008: IV removed  1013: Pt transported to [facility]

## 2025-08-04 LAB
LABORATORY COMMENT REPORT: NORMAL
PATH REPORT.FINAL DX SPEC: NORMAL
PATH REPORT.GROSS SPEC: NORMAL
PATH REPORT.TOTAL CANCER: NORMAL

## 2025-08-28 ENCOUNTER — OFFICE VISIT (OUTPATIENT)
Dept: GASTROENTEROLOGY | Facility: CLINIC | Age: 55
End: 2025-08-28
Payer: MEDICAID

## 2025-08-28 VITALS
HEIGHT: 70 IN | DIASTOLIC BLOOD PRESSURE: 82 MMHG | TEMPERATURE: 98.1 F | BODY MASS INDEX: 31.35 KG/M2 | WEIGHT: 219 LBS | SYSTOLIC BLOOD PRESSURE: 136 MMHG | HEART RATE: 70 BPM

## 2025-08-28 DIAGNOSIS — K64.9 HEMORRHOIDS, UNSPECIFIED HEMORRHOID TYPE: Primary | ICD-10-CM

## 2025-08-28 PROCEDURE — 99212 OFFICE O/P EST SF 10 MIN: CPT

## 2025-08-28 PROCEDURE — 1036F TOBACCO NON-USER: CPT | Performed by: STUDENT IN AN ORGANIZED HEALTH CARE EDUCATION/TRAINING PROGRAM

## 2025-08-28 PROCEDURE — 99214 OFFICE O/P EST MOD 30 MIN: CPT | Performed by: STUDENT IN AN ORGANIZED HEALTH CARE EDUCATION/TRAINING PROGRAM

## 2025-08-28 PROCEDURE — 3008F BODY MASS INDEX DOCD: CPT | Performed by: STUDENT IN AN ORGANIZED HEALTH CARE EDUCATION/TRAINING PROGRAM

## 2025-08-28 ASSESSMENT — PAIN SCALES - GENERAL: PAINLEVEL_OUTOF10: 0-NO PAIN

## 2026-05-07 ENCOUNTER — APPOINTMENT (OUTPATIENT)
Dept: PRIMARY CARE | Facility: CLINIC | Age: 56
End: 2026-05-07
Payer: MEDICAID